# Patient Record
Sex: FEMALE | Race: WHITE | NOT HISPANIC OR LATINO | Employment: FULL TIME | URBAN - METROPOLITAN AREA
[De-identification: names, ages, dates, MRNs, and addresses within clinical notes are randomized per-mention and may not be internally consistent; named-entity substitution may affect disease eponyms.]

---

## 2017-02-10 ENCOUNTER — TRANSCRIBE ORDERS (OUTPATIENT)
Dept: ADMINISTRATIVE | Facility: HOSPITAL | Age: 27
End: 2017-02-10

## 2017-02-10 DIAGNOSIS — R00.2 PALPITATIONS: Primary | ICD-10-CM

## 2017-02-16 ENCOUNTER — APPOINTMENT (OUTPATIENT)
Dept: CARDIOLOGY | Facility: HOSPITAL | Age: 27
End: 2017-02-16
Attending: INTERNAL MEDICINE

## 2018-03-23 PROBLEM — M79.604 RIGHT LEG PAIN: Status: ACTIVE | Noted: 2018-03-23

## 2019-02-27 ENCOUNTER — OFFICE VISIT (OUTPATIENT)
Dept: FAMILY MEDICINE CLINIC | Facility: CLINIC | Age: 29
End: 2019-02-27

## 2019-02-27 VITALS
BODY MASS INDEX: 39.99 KG/M2 | SYSTOLIC BLOOD PRESSURE: 116 MMHG | HEIGHT: 65 IN | OXYGEN SATURATION: 98 % | TEMPERATURE: 98.3 F | DIASTOLIC BLOOD PRESSURE: 62 MMHG | HEART RATE: 81 BPM | WEIGHT: 240 LBS

## 2019-02-27 DIAGNOSIS — E55.9 VITAMIN D DEFICIENCY: ICD-10-CM

## 2019-02-27 DIAGNOSIS — R19.7 DIARRHEA, UNSPECIFIED TYPE: ICD-10-CM

## 2019-02-27 DIAGNOSIS — E61.1 IRON DEFICIENCY: ICD-10-CM

## 2019-02-27 DIAGNOSIS — Z13.220 SCREENING FOR HYPERLIPIDEMIA: ICD-10-CM

## 2019-02-27 DIAGNOSIS — N83.201 CYST OF RIGHT OVARY: ICD-10-CM

## 2019-02-27 DIAGNOSIS — F33.0 MILD EPISODE OF RECURRENT MAJOR DEPRESSIVE DISORDER (HCC): ICD-10-CM

## 2019-02-27 DIAGNOSIS — Z13.29 SCREENING FOR ENDOCRINE DISORDER: ICD-10-CM

## 2019-02-27 DIAGNOSIS — F41.9 ANXIETY: ICD-10-CM

## 2019-02-27 DIAGNOSIS — R20.0 NUMBNESS OF FINGERS OF BOTH HANDS: Primary | ICD-10-CM

## 2019-02-27 PROCEDURE — 99203 OFFICE O/P NEW LOW 30 MIN: CPT | Performed by: FAMILY MEDICINE

## 2019-03-10 NOTE — PROGRESS NOTES
Subjective   Sasha Stewart is a 28 y.o. female.     Chief Complaint   Patient presents with   • Establish Care   • Abdominal Pain     Since thursday   • Diarrhea     Since thursday   • Carpal Tunnel   • Depression       History of Present Illness     Acute complaints:  Sasha Stewart is a 28 y.o. female who presents today to establish care. She reports bilateral numbness in her hands. Global, not in a specific nerve distribution. It has been going on past few months. Not worsening. It does not appear related to anything specific. Happens randomly.    She has chronic depression and anxiety. She has a history of a right ovarian cyst which she reports is stable. She reports diarrhea and abdominal pain since Thursday.    The following portions of the patient's history were reviewed and updated as appropriate: allergies, current medications, past family history, past medical history, past social history, past surgical history and problem list.    Active Ambulatory Problems     Diagnosis Date Noted   • Dichorionic diamniotic twin gestation 2016   • Uterine size date discrepancy 2016   • Previous  section complicating pregnancy 2016   • Dichorionic diamniotic twin pregnancy in third trimester 2016   • Right leg pain 2018     Resolved Ambulatory Problems     Diagnosis Date Noted   • No Resolved Ambulatory Problems     Past Medical History:   Diagnosis Date   • Anemia    • Asthma    • Depression    • Fibroid    • PID (pelvic inflammatory disease)      Past Surgical History:   Procedure Laterality Date   •  SECTION      2013   •  SECTION WITH TUBAL Bilateral 2016    Procedure:  SECTION REPEAT WITH TUBAL;  Surgeon: Chrissy Hampton MD;  Location: [facility] LABOR DELIVERY;  Service:    • GASTRIC SLEEVE LAPAROSCOPIC     • TUBAL ABDOMINAL LIGATION     • WISDOM TOOTH EXTRACTION       Family History   Problem Relation Age of Onset   • Diabetes Mother    •  "Hypertension Mother    • Arthritis Mother    • Obesity Mother    • Hypertension Father    • Coronary artery disease Paternal Grandfather    • Stroke Paternal Grandfather    • Hypertension Maternal Grandmother    • Arthritis Sister    • Obesity Sister    • Migraines Sister    • Obesity Brother    • Arthritis Maternal Grandfather      Social History     Socioeconomic History   • Marital status:      Spouse name: Not on file   • Number of children: Not on file   • Years of education: Not on file   • Highest education level: Not on file   Social Needs   • Financial resource strain: Not on file   • Food insecurity - worry: Not on file   • Food insecurity - inability: Not on file   • Transportation needs - medical: Not on file   • Transportation needs - non-medical: Not on file   Occupational History   • Not on file   Tobacco Use   • Smoking status: Never Smoker   • Smokeless tobacco: Never Used   Substance and Sexual Activity   • Alcohol use: No   • Drug use: No   • Sexual activity: Yes     Partners: Male   Other Topics Concern   • Not on file   Social History Narrative   • Not on file         Review of Systems   Constitutional: Negative.    Gastrointestinal: Positive for abdominal pain and diarrhea. Negative for abdominal distention.   Neurological: Positive for numbness.       Objective   Blood pressure 116/62, pulse 81, temperature 98.3 °F (36.8 °C), temperature source Oral, height 165.1 cm (65\"), weight 109 kg (240 lb), SpO2 98 %, not currently breastfeeding.  Nursing note reviewed  Physical Exam  Const: NAD, A&Ox4, Pleasant, Cooperative  Eyes: EOMI, no conjunctivitis  ENT: No nasal discharge present, neck supple  Cardiac: Regular rate and rhythm, no cyanosis  Resp: Respiratory rate within normal limits, no increased work of breathing, no audible wheezing or retractions noted  GI: No distention or ascites  MSK: Motor and sensation grossly intact in bilateral upper extremities. Tinel negative at carpal tunnel " and cubital tunnel. Phalen negative  Neurologic: CN II-XII grossly intact  Psych: Appropriate mood and behavior.  Skin: Pink, warm, dry  Procedures  Assessment/Plan   Sasha was seen today for establish care, abdominal pain, diarrhea, carpal tunnel and depression.    Diagnoses and all orders for this visit:    Numbness of fingers of both hands  -     Hemoglobin A1c; Future  -     TSH; Future  -     Vitamin B12; Future  -     Vitamin B1, Whole Blood; Future    Mild episode of recurrent major depressive disorder (CMS/HCC)  -     sertraline (ZOLOFT) 50 MG tablet; Take 1 tablet by mouth Daily.    Anxiety  -     sertraline (ZOLOFT) 50 MG tablet; Take 1 tablet by mouth Daily.    Screening for endocrine disorder  -     Comprehensive Metabolic Panel; Future  -     TSH; Future  -     Urinalysis With Microscopic If Indicated (No Culture) - Urine, Clean Catch; Future    Screening for hyperlipidemia  -     Lipid Panel; Future    Diarrhea, unspecified type  -     CBC & Differential; Future    Cyst of right ovary    Iron deficiency  -     CBC & Differential; Future    Vitamin D deficiency  -     Vitamin D 25 Hydroxy; Future        Acute concerns:  #1 paresthesias of bilat UE  Most consistent with cubital tunnel  Discussed home treatments including towel wrap. Happens mostly at night with arms flexed underneath, improves when she stretches it out.  - Check labs    #2 depression with anxiety  Zoloft 50mg  Follow up 2 weeks    #3 diarrhea  She appears to have relation to her cycle      We will plan to obtain previous records both for chronic preventative care as well as those related to the current episode of care.  Any records that the patient brought with her today were reviewed personally by me during the visit today and will be scanned into the chart for posterity.    Patient Instructions   1.  Try towel wrapping for cubital tunnel syndrome around elbows.    2.  Have lab work done when fasting.    3.  Monitor increased stool  through cycle.    4.  Follow up in 1-2 weeks for lab review and recheck.      Return in about 2 weeks (around 3/13/2019).    Ambulatory progress note signed and attested to by Pino Willis D.O.

## 2019-03-22 ENCOUNTER — LAB (OUTPATIENT)
Dept: LAB | Facility: HOSPITAL | Age: 29
End: 2019-03-22

## 2019-03-22 DIAGNOSIS — Z13.220 SCREENING FOR HYPERLIPIDEMIA: ICD-10-CM

## 2019-03-22 DIAGNOSIS — E55.9 VITAMIN D DEFICIENCY: ICD-10-CM

## 2019-03-22 DIAGNOSIS — R19.7 DIARRHEA, UNSPECIFIED TYPE: ICD-10-CM

## 2019-03-22 DIAGNOSIS — Z13.29 SCREENING FOR ENDOCRINE DISORDER: ICD-10-CM

## 2019-03-22 DIAGNOSIS — R20.0 NUMBNESS OF FINGERS OF BOTH HANDS: ICD-10-CM

## 2019-03-22 DIAGNOSIS — E61.1 IRON DEFICIENCY: ICD-10-CM

## 2019-03-22 LAB
25(OH)D3 SERPL-MCNC: 15.2 NG/ML
ARTICHOKE IGE QN: 93 MG/DL (ref 0–130)
BASOPHILS # BLD AUTO: 0.02 10*3/MM3 (ref 0–0.2)
BASOPHILS NFR BLD AUTO: 0.6 % (ref 0–1)
CHOLEST SERPL-MCNC: 143 MG/DL (ref 0–200)
DEPRECATED RDW RBC AUTO: 43.2 FL (ref 37–54)
EOSINOPHIL # BLD AUTO: 0.11 10*3/MM3 (ref 0–0.3)
EOSINOPHIL NFR BLD AUTO: 3.1 % (ref 0–3)
ERYTHROCYTE [DISTWIDTH] IN BLOOD BY AUTOMATED COUNT: 14.4 % (ref 11.3–14.5)
HBA1C MFR BLD: 4.7 % (ref 4.8–5.6)
HCT VFR BLD AUTO: 36.3 % (ref 34.5–44)
HDLC SERPL-MCNC: 50 MG/DL (ref 40–60)
HGB BLD-MCNC: 11.3 G/DL (ref 11.5–15.5)
IMM GRANULOCYTES # BLD AUTO: 0.01 10*3/MM3 (ref 0–0.05)
IMM GRANULOCYTES NFR BLD AUTO: 0.3 % (ref 0–0.6)
LYMPHOCYTES # BLD AUTO: 0.84 10*3/MM3 (ref 0.6–4.8)
LYMPHOCYTES NFR BLD AUTO: 23.3 % (ref 24–44)
MCH RBC QN AUTO: 25.7 PG (ref 27–31)
MCHC RBC AUTO-ENTMCNC: 31.1 G/DL (ref 32–36)
MCV RBC AUTO: 82.7 FL (ref 80–99)
MONOCYTES # BLD AUTO: 0.44 10*3/MM3 (ref 0–1)
MONOCYTES NFR BLD AUTO: 12.2 % (ref 0–12)
NEUTROPHILS # BLD AUTO: 2.19 10*3/MM3 (ref 1.5–8.3)
NEUTROPHILS NFR BLD AUTO: 60.8 % (ref 41–71)
PLATELET # BLD AUTO: 296 10*3/MM3 (ref 150–450)
PMV BLD AUTO: 11.4 FL (ref 6–12)
RBC # BLD AUTO: 4.39 10*6/MM3 (ref 3.89–5.14)
TRIGL SERPL-MCNC: 57 MG/DL (ref 0–150)
TSH SERPL DL<=0.05 MIU/L-ACNC: 0.6 MIU/ML (ref 0.35–5.35)
VIT B12 BLD-MCNC: 503 PG/ML (ref 211–911)
WBC NRBC COR # BLD: 3.6 10*3/MM3 (ref 3.5–10.8)

## 2019-03-22 PROCEDURE — 84443 ASSAY THYROID STIM HORMONE: CPT

## 2019-03-22 PROCEDURE — 36415 COLL VENOUS BLD VENIPUNCTURE: CPT

## 2019-03-22 PROCEDURE — 82306 VITAMIN D 25 HYDROXY: CPT

## 2019-03-22 PROCEDURE — 80061 LIPID PANEL: CPT

## 2019-03-22 PROCEDURE — 83036 HEMOGLOBIN GLYCOSYLATED A1C: CPT

## 2019-03-22 PROCEDURE — 85025 COMPLETE CBC W/AUTO DIFF WBC: CPT

## 2019-03-22 PROCEDURE — 84425 ASSAY OF VITAMIN B-1: CPT

## 2019-03-22 PROCEDURE — 82607 VITAMIN B-12: CPT

## 2019-03-26 LAB — VIT B1 BLD-SCNC: 117.7 NMOL/L (ref 66.5–200)

## 2019-03-27 ENCOUNTER — LAB (OUTPATIENT)
Dept: LAB | Facility: HOSPITAL | Age: 29
End: 2019-03-27

## 2019-03-27 ENCOUNTER — TRANSCRIBE ORDERS (OUTPATIENT)
Dept: LAB | Facility: HOSPITAL | Age: 29
End: 2019-03-27

## 2019-03-27 DIAGNOSIS — Z13.29 SCREENING FOR ENDOCRINE DISORDER: ICD-10-CM

## 2019-03-27 DIAGNOSIS — Z13.29 SCREENING FOR ENDOCRINE DISORDER: Primary | ICD-10-CM

## 2019-03-27 LAB
ALBUMIN SERPL-MCNC: 4.14 G/DL (ref 3.2–4.8)
ALBUMIN/GLOB SERPL: 1.7 G/DL (ref 1.5–2.5)
ALP SERPL-CCNC: 111 U/L (ref 25–100)
ALT SERPL W P-5'-P-CCNC: 26 U/L (ref 7–40)
ANION GAP SERPL CALCULATED.3IONS-SCNC: 8 MMOL/L (ref 3–11)
AST SERPL-CCNC: 19 U/L (ref 0–33)
BACTERIA UR QL AUTO: NORMAL /HPF
BILIRUB SERPL-MCNC: 0.2 MG/DL (ref 0.3–1.2)
BILIRUB UR QL STRIP: NEGATIVE
BUN BLD-MCNC: 14 MG/DL (ref 9–23)
BUN/CREAT SERPL: 21.5 (ref 7–25)
CALCIUM SPEC-SCNC: 9 MG/DL (ref 8.7–10.4)
CHLORIDE SERPL-SCNC: 109 MMOL/L (ref 99–109)
CLARITY UR: CLEAR
CO2 SERPL-SCNC: 24 MMOL/L (ref 20–31)
COLOR UR: YELLOW
CREAT BLD-MCNC: 0.65 MG/DL (ref 0.6–1.3)
GFR SERPL CREATININE-BSD FRML MDRD: 109 ML/MIN/1.73
GLOBULIN UR ELPH-MCNC: 2.5 GM/DL
GLUCOSE BLD-MCNC: 89 MG/DL (ref 70–100)
GLUCOSE UR STRIP-MCNC: NEGATIVE MG/DL
HGB UR QL STRIP.AUTO: NEGATIVE
HYALINE CASTS UR QL AUTO: NORMAL /LPF
KETONES UR QL STRIP: NEGATIVE
LEUKOCYTE ESTERASE UR QL STRIP.AUTO: NEGATIVE
NITRITE UR QL STRIP: POSITIVE
PH UR STRIP.AUTO: 6.5 [PH] (ref 5–8)
POTASSIUM BLD-SCNC: 4 MMOL/L (ref 3.5–5.5)
PROT SERPL-MCNC: 6.6 G/DL (ref 5.7–8.2)
PROT UR QL STRIP: NEGATIVE
RBC # UR: NORMAL /HPF
REF LAB TEST METHOD: NORMAL
SODIUM BLD-SCNC: 141 MMOL/L (ref 132–146)
SP GR UR STRIP: 1.03 (ref 1–1.03)
SQUAMOUS #/AREA URNS HPF: NORMAL /HPF
UROBILINOGEN UR QL STRIP: ABNORMAL
WBC UR QL AUTO: NORMAL /HPF

## 2019-03-27 PROCEDURE — 81001 URINALYSIS AUTO W/SCOPE: CPT | Performed by: FAMILY MEDICINE

## 2019-03-27 PROCEDURE — 80053 COMPREHEN METABOLIC PANEL: CPT

## 2019-04-01 ENCOUNTER — TELEPHONE (OUTPATIENT)
Dept: FAMILY MEDICINE CLINIC | Facility: CLINIC | Age: 29
End: 2019-04-01

## 2019-04-16 RX ORDER — DOXYCYCLINE HYCLATE 50 MG/1
324 CAPSULE, GELATIN COATED ORAL
Qty: 90 TABLET | Refills: 0 | OUTPATIENT
Start: 2019-04-16 | End: 2019-04-25

## 2019-04-16 NOTE — TELEPHONE ENCOUNTER
PATIENT HAS BEEN WAITING FOR TWO WEEKS TO GET HER LAB RESULTS. SHE WOULD LIKE A CALL AND THE LETTER SENT.

## 2019-04-25 PROBLEM — J02.0 STREP PHARYNGITIS: Status: ACTIVE | Noted: 2019-04-25

## 2019-09-03 PROBLEM — J02.9 PHARYNGITIS: Status: ACTIVE | Noted: 2019-09-03

## 2019-09-03 PROBLEM — Z20.818 STREP THROAT EXPOSURE: Status: ACTIVE | Noted: 2019-09-03

## 2021-01-15 ENCOUNTER — OFFICE VISIT (OUTPATIENT)
Dept: FAMILY MEDICINE CLINIC | Facility: CLINIC | Age: 31
End: 2021-01-15

## 2021-01-15 VITALS
OXYGEN SATURATION: 99 % | SYSTOLIC BLOOD PRESSURE: 142 MMHG | HEIGHT: 65 IN | DIASTOLIC BLOOD PRESSURE: 88 MMHG | WEIGHT: 246 LBS | HEART RATE: 96 BPM | BODY MASS INDEX: 40.98 KG/M2

## 2021-01-15 DIAGNOSIS — Z00.00 ROUTINE MEDICAL EXAM: ICD-10-CM

## 2021-01-15 DIAGNOSIS — G44.219 EPISODIC TENSION-TYPE HEADACHE, NOT INTRACTABLE: ICD-10-CM

## 2021-01-15 DIAGNOSIS — R10.11 RIGHT UPPER QUADRANT ABDOMINAL PAIN: ICD-10-CM

## 2021-01-15 DIAGNOSIS — R11.0 NAUSEA: Primary | ICD-10-CM

## 2021-01-15 LAB
B-HCG UR QL: NEGATIVE
INTERNAL NEGATIVE CONTROL: NEGATIVE
INTERNAL POSITIVE CONTROL: POSITIVE
Lab: NORMAL

## 2021-01-15 PROCEDURE — 99214 OFFICE O/P EST MOD 30 MIN: CPT | Performed by: PHYSICIAN ASSISTANT

## 2021-01-15 PROCEDURE — 81025 URINE PREGNANCY TEST: CPT | Performed by: PHYSICIAN ASSISTANT

## 2021-01-15 RX ORDER — AMITRIPTYLINE HYDROCHLORIDE 25 MG/1
25 TABLET, FILM COATED ORAL NIGHTLY
Qty: 30 TABLET | Refills: 1 | Status: SHIPPED | OUTPATIENT
Start: 2021-01-15 | End: 2021-02-20 | Stop reason: SDUPTHER

## 2021-01-15 RX ORDER — OMEPRAZOLE 20 MG/1
20 CAPSULE, DELAYED RELEASE ORAL DAILY
Qty: 30 CAPSULE | Refills: 1 | Status: SHIPPED | OUTPATIENT
Start: 2021-01-15 | End: 2021-09-14

## 2021-01-15 NOTE — PROGRESS NOTES
"    Chief Complaint   Patient presents with   • Headache   • Nausea     Hx of h. pylori - when patient eats or drinks she gets nauseated        HPI     Sasha Stewart is a pleasant 30 y.o. female who presents for evaluation of \"chief complaint.\"     Nausea and lightheaded with food and liquids for 1 week. Denies abdominal pain. Hx tubal ligation in . History of h.pylori with similar symptoms. Last time was in 2017.       New onset headaches for 2 months. Frontal headache primarily on the left. No associated symptoms. Intermittent. Aleve has helped. Yesterday did not. She does acknowledge more stress over the last 2 months.      Past Medical History:   Diagnosis Date   • Anemia    • Asthma    • Depression    • Fibroid    • PID (pelvic inflammatory disease)        Past Surgical History:   Procedure Laterality Date   •  SECTION      2013   •  SECTION WITH TUBAL Bilateral 2016    Procedure:  SECTION REPEAT WITH TUBAL;  Surgeon: Chrissy Hampton MD;  Location: [facility] LABOR DELIVERY;  Service:    • GASTRIC SLEEVE LAPAROSCOPIC     • TUBAL ABDOMINAL LIGATION     • WISDOM TOOTH EXTRACTION         Family History   Problem Relation Age of Onset   • Diabetes Mother    • Hypertension Mother    • Arthritis Mother    • Obesity Mother    • Hypertension Father    • Coronary artery disease Paternal Grandfather    • Stroke Paternal Grandfather    • Hypertension Maternal Grandmother    • Arthritis Sister    • Obesity Sister    • Migraines Sister    • Obesity Brother    • Arthritis Maternal Grandfather        Social History     Socioeconomic History   • Marital status:      Spouse name: Not on file   • Number of children: Not on file   • Years of education: Not on file   • Highest education level: Not on file   Tobacco Use   • Smoking status: Never Smoker   • Smokeless tobacco: Never Used   Substance and Sexual Activity   • Alcohol use: No   • Drug use: No   • Sexual activity: Yes     Partners: " Male       No Known Allergies    ROS    Review of Systems   Constitutional: Negative for chills and fever.   Gastrointestinal: Positive for nausea. Negative for abdominal pain, blood in stool, constipation, diarrhea, vomiting and GERD.   Neurological: Positive for light-headedness.       Vitals:    01/15/21 1500   BP: 142/88   Pulse: 96   SpO2: 99%     Body mass index is 40.94 kg/m².      Current Outpatient Medications:   •  sertraline (ZOLOFT) 50 MG tablet, Take 1 tablet by mouth Daily., Disp: 90 tablet, Rfl: 0  •  amitriptyline (ELAVIL) 25 MG tablet, Take 1 tablet by mouth Every Night., Disp: 30 tablet, Rfl: 1  •  omeprazole (PrilOSEC) 20 MG capsule, Take 1 capsule by mouth Daily., Disp: 30 capsule, Rfl: 1    PE    Physical Exam  Vitals signs reviewed.   Constitutional:       General: She is not in acute distress.     Appearance: She is well-developed. She is obese.   HENT:      Head: Normocephalic and atraumatic.   Eyes:      Conjunctiva/sclera: Conjunctivae normal.   Neck:      Musculoskeletal: Normal range of motion.   Cardiovascular:      Rate and Rhythm: Normal rate and regular rhythm.      Heart sounds: Normal heart sounds. No murmur.   Pulmonary:      Effort: Pulmonary effort is normal.      Breath sounds: Normal breath sounds.   Abdominal:      Tenderness: There is abdominal tenderness in the right upper quadrant. There is no guarding or rebound.   Skin:     General: Skin is warm and dry.   Neurological:      Mental Status: She is alert.      Gait: Gait normal.   Psychiatric:         Speech: Speech normal.         Behavior: Behavior normal.          A/P    Problem List Items Addressed This Visit     None      Visit Diagnoses     Nausea    -  Primary  -Worse with meals, negative urine HCG today  -Ddx: gastritis, h.pylori, PUD, pancreatitis, gallbladder disorder, among others  -Order breath test, lipase, GB u/s  -Start omeprazole  -RTC in 1 month for physical with Dr. Willis or sooner if needed    Relevant  Orders    US Gallbladder    Lipase     POC Pregnancy, Urine     H. Pylori Breath Test - Breath, Lung     Right upper quadrant abdominal pain        Relevant Orders    US Gallbladder    Episodic tension-type headache, not intractable      -Hx increased stress  -Start low dose amitriptyline qhs for tension headache  -NSAIDs prn    Relevant Medications    sertraline (ZOLOFT) 50 MG tablet    amitriptyline (ELAVIL) 25 MG tablet    Routine medical exam        Relevant Orders    Ferritin     CBC & Differential     Comprehensive Metabolic Panel     Lipid Panel     TSH Rfx On Abnormal To Free T4     Urinalysis With Culture If Indicated - Urine, Clean Catch    Iron Profile     Vitamin D 25 Hydroxy           Plan of care was reviewed with patient at the conclusion of today's visit. Education was provided regarding diagnoses, management, prescribed or recommended OTC products, and the importance of compliance with follow-up appointments. The patient was counseled regarding the risks, benefits, and possible side-effects of treatment. I advised the patient to keep me informed of any acute changes in their status including new, worsening, or persistent symptoms. Patient expresses understanding and agreement with the management plan.        MARILOU Howell

## 2021-01-26 ENCOUNTER — LAB (OUTPATIENT)
Dept: LAB | Facility: HOSPITAL | Age: 31
End: 2021-01-26

## 2021-01-26 DIAGNOSIS — Z00.00 ROUTINE MEDICAL EXAM: ICD-10-CM

## 2021-01-26 DIAGNOSIS — R11.0 NAUSEA: ICD-10-CM

## 2021-01-26 LAB
BASOPHILS # BLD MANUAL: 0.12 10*3/MM3 (ref 0–0.2)
BASOPHILS NFR BLD AUTO: 2.1 % (ref 0–1.5)
DEPRECATED RDW RBC AUTO: 37.3 FL (ref 37–54)
EOSINOPHIL # BLD MANUAL: 0.06 10*3/MM3 (ref 0–0.4)
EOSINOPHIL NFR BLD MANUAL: 1 % (ref 0.3–6.2)
ERYTHROCYTE [DISTWIDTH] IN BLOOD BY AUTOMATED COUNT: 14.3 % (ref 12.3–15.4)
HCT VFR BLD AUTO: 30 % (ref 34–46.6)
HGB BLD-MCNC: 9.3 G/DL (ref 12–15.9)
LYMPHOCYTES # BLD MANUAL: 1.03 10*3/MM3 (ref 0.7–3.1)
LYMPHOCYTES NFR BLD MANUAL: 1 % (ref 5–12)
LYMPHOCYTES NFR BLD MANUAL: 17.5 % (ref 19.6–45.3)
MCH RBC QN AUTO: 23.1 PG (ref 26.6–33)
MCHC RBC AUTO-ENTMCNC: 31 G/DL (ref 31.5–35.7)
MCV RBC AUTO: 74.6 FL (ref 79–97)
MICROCYTES BLD QL: ABNORMAL
MONOCYTES # BLD AUTO: 0.06 10*3/MM3 (ref 0.1–0.9)
NEUTROPHILS # BLD AUTO: 4.63 10*3/MM3 (ref 1.7–7)
NEUTROPHILS NFR BLD MANUAL: 78.4 % (ref 42.7–76)
PLAT MORPH BLD: NORMAL
PLATELET # BLD AUTO: 460 10*3/MM3 (ref 140–450)
PMV BLD AUTO: 10.9 FL (ref 6–12)
RBC # BLD AUTO: 4.02 10*6/MM3 (ref 3.77–5.28)
WBC # BLD AUTO: 5.9 10*3/MM3 (ref 3.4–10.8)
WBC MORPH BLD: NORMAL

## 2021-01-26 PROCEDURE — 84466 ASSAY OF TRANSFERRIN: CPT

## 2021-01-26 PROCEDURE — 85007 BL SMEAR W/DIFF WBC COUNT: CPT

## 2021-01-26 PROCEDURE — 82728 ASSAY OF FERRITIN: CPT

## 2021-01-26 PROCEDURE — 83013 H PYLORI (C-13) BREATH: CPT

## 2021-01-26 PROCEDURE — 83540 ASSAY OF IRON: CPT

## 2021-01-26 PROCEDURE — 81001 URINALYSIS AUTO W/SCOPE: CPT

## 2021-01-26 PROCEDURE — 36415 COLL VENOUS BLD VENIPUNCTURE: CPT

## 2021-01-26 PROCEDURE — 80053 COMPREHEN METABOLIC PANEL: CPT

## 2021-01-26 PROCEDURE — 80061 LIPID PANEL: CPT

## 2021-01-26 PROCEDURE — 83690 ASSAY OF LIPASE: CPT

## 2021-01-26 PROCEDURE — 84443 ASSAY THYROID STIM HORMONE: CPT

## 2021-01-26 PROCEDURE — 85025 COMPLETE CBC W/AUTO DIFF WBC: CPT

## 2021-01-26 PROCEDURE — 82306 VITAMIN D 25 HYDROXY: CPT

## 2021-01-27 LAB
25(OH)D3 SERPL-MCNC: 12.5 NG/ML (ref 30–100)
ALBUMIN SERPL-MCNC: 4.4 G/DL (ref 3.5–5.2)
ALBUMIN/GLOB SERPL: 1.4 G/DL
ALP SERPL-CCNC: 97 U/L (ref 39–117)
ALT SERPL W P-5'-P-CCNC: 18 U/L (ref 1–33)
ANION GAP SERPL CALCULATED.3IONS-SCNC: 10.4 MMOL/L (ref 5–15)
AST SERPL-CCNC: 19 U/L (ref 1–32)
BACTERIA UR QL AUTO: NORMAL /HPF
BILIRUB SERPL-MCNC: <0.2 MG/DL (ref 0–1.2)
BILIRUB UR QL STRIP: NEGATIVE
BUN SERPL-MCNC: 19 MG/DL (ref 6–20)
BUN/CREAT SERPL: 27.9 (ref 7–25)
CALCIUM SPEC-SCNC: 9.4 MG/DL (ref 8.6–10.5)
CHLORIDE SERPL-SCNC: 103 MMOL/L (ref 98–107)
CHOLEST SERPL-MCNC: 192 MG/DL (ref 0–200)
CLARITY UR: CLEAR
CO2 SERPL-SCNC: 24.6 MMOL/L (ref 22–29)
COLOR UR: YELLOW
CREAT SERPL-MCNC: 0.68 MG/DL (ref 0.57–1)
FERRITIN SERPL-MCNC: 7.23 NG/ML (ref 13–150)
GFR SERPL CREATININE-BSD FRML MDRD: 102 ML/MIN/1.73
GLOBULIN UR ELPH-MCNC: 3.1 GM/DL
GLUCOSE SERPL-MCNC: 72 MG/DL (ref 65–99)
GLUCOSE UR STRIP-MCNC: NEGATIVE MG/DL
HDLC SERPL-MCNC: 65 MG/DL (ref 40–60)
HGB UR QL STRIP.AUTO: ABNORMAL
HYALINE CASTS UR QL AUTO: NORMAL /LPF
IRON 24H UR-MRATE: 18 MCG/DL (ref 37–145)
IRON SATN MFR SERPL: 3 % (ref 20–50)
KETONES UR QL STRIP: NEGATIVE
LDLC SERPL CALC-MCNC: 115 MG/DL (ref 0–100)
LDLC/HDLC SERPL: 1.76 {RATIO}
LEUKOCYTE ESTERASE UR QL STRIP.AUTO: NEGATIVE
LIPASE SERPL-CCNC: 23 U/L (ref 13–60)
NITRITE UR QL STRIP: NEGATIVE
PH UR STRIP.AUTO: 5.5 [PH] (ref 5–8)
POTASSIUM SERPL-SCNC: 3.6 MMOL/L (ref 3.5–5.2)
PROT SERPL-MCNC: 7.5 G/DL (ref 6–8.5)
PROT UR QL STRIP: NEGATIVE
RBC # UR: NORMAL /HPF
REF LAB TEST METHOD: NORMAL
SODIUM SERPL-SCNC: 138 MMOL/L (ref 136–145)
SP GR UR STRIP: >=1.03 (ref 1–1.03)
SQUAMOUS #/AREA URNS HPF: NORMAL /HPF
TIBC SERPL-MCNC: 517 MCG/DL (ref 298–536)
TRANSFERRIN SERPL-MCNC: 347 MG/DL (ref 200–360)
TRIGL SERPL-MCNC: 62 MG/DL (ref 0–150)
TSH SERPL DL<=0.05 MIU/L-ACNC: 1.02 UIU/ML (ref 0.27–4.2)
UROBILINOGEN UR QL STRIP: ABNORMAL
VLDLC SERPL-MCNC: 12 MG/DL (ref 5–40)
WBC UR QL AUTO: NORMAL /HPF

## 2021-01-28 LAB — UREA BREATH TEST QL: NEGATIVE

## 2021-01-29 ENCOUNTER — TELEPHONE (OUTPATIENT)
Dept: FAMILY MEDICINE CLINIC | Facility: CLINIC | Age: 31
End: 2021-01-29

## 2021-01-29 PROBLEM — E55.9 VITAMIN D DEFICIENCY: Status: ACTIVE | Noted: 2021-01-29

## 2021-01-29 PROBLEM — D50.9 IRON DEFICIENCY ANEMIA: Status: ACTIVE | Noted: 2021-01-29

## 2021-01-29 RX ORDER — ERGOCALCIFEROL 1.25 MG/1
50000 CAPSULE ORAL WEEKLY
Qty: 12 CAPSULE | Refills: 0 | Status: SHIPPED | OUTPATIENT
Start: 2021-01-29 | End: 2021-09-14

## 2021-01-29 RX ORDER — DOXYCYCLINE HYCLATE 50 MG/1
324 CAPSULE, GELATIN COATED ORAL EVERY OTHER DAY
Qty: 90 TABLET | Refills: 0 | Status: SHIPPED | OUTPATIENT
Start: 2021-01-29 | End: 2022-04-28

## 2021-02-19 ENCOUNTER — OFFICE VISIT (OUTPATIENT)
Dept: FAMILY MEDICINE CLINIC | Facility: CLINIC | Age: 31
End: 2021-02-19

## 2021-02-19 VITALS
WEIGHT: 265.2 LBS | OXYGEN SATURATION: 98 % | BODY MASS INDEX: 44.18 KG/M2 | HEART RATE: 78 BPM | HEIGHT: 65 IN | SYSTOLIC BLOOD PRESSURE: 124 MMHG | DIASTOLIC BLOOD PRESSURE: 78 MMHG

## 2021-02-19 DIAGNOSIS — G44.209 TENSION HEADACHE: ICD-10-CM

## 2021-02-19 DIAGNOSIS — R42 VERTIGO: Primary | ICD-10-CM

## 2021-02-19 DIAGNOSIS — G47.9 SLEEP DISORDER: ICD-10-CM

## 2021-02-19 PROCEDURE — 99214 OFFICE O/P EST MOD 30 MIN: CPT | Performed by: PHYSICIAN ASSISTANT

## 2021-02-19 NOTE — PATIENT INSTRUCTIONS
-Increase amitriptyline to 1.5 tablets nightly for headaches and sleep  -Try the Epley's maneuver at home for vertigo.  If symptoms continue after 1 week, please call for a physical therapy referral    Benign Positional Vertigo  Vertigo is the feeling that you or your surroundings are moving when they are not. Benign positional vertigo is the most common form of vertigo. This is usually a harmless condition (benign). This condition is positional. This means that symptoms are triggered by certain movements and positions.  This condition can be dangerous if it occurs while you are doing something that could cause harm to you or others. This includes activities such as driving or operating machinery.  What are the causes?  In many cases, the cause of this condition is not known. It may be caused by a disturbance in an area of the inner ear that helps your brain to sense movement and balance. This disturbance can be caused by:  · Viral infection (labyrinthitis).  · Head injury.  · Repetitive motion, such as jumping, dancing, or running.  What increases the risk?  You are more likely to develop this condition if:  · You are a woman.  · You are 50 years of age or older.  What are the signs or symptoms?  Symptoms of this condition usually happen when you move your head or your eyes in different directions. Symptoms may start suddenly, and usually last for less than a minute. They include:  · Loss of balance and falling.  · Feeling like you are spinning or moving.  · Feeling like your surroundings are spinning or moving.  · Nausea and vomiting.  · Blurred vision.  · Dizziness.  · Involuntary eye movement (nystagmus).  Symptoms can be mild and cause only minor problems, or they can be severe and interfere with daily life. Episodes of benign positional vertigo may return (recur) over time. Symptoms may improve over time.  How is this diagnosed?  This condition may be diagnosed based on:  · Your medical history.  · Physical  exam of the head, neck, and ears.  · Tests, such as:  ? MRI.  ? CT scan.  ? Eye movement tests. Your health care provider may ask you to change positions quickly while he or she watches you for symptoms of benign positional vertigo, such as nystagmus. Eye movement may be tested with a variety of exams that are designed to evaluate or stimulate vertigo.  ? An electroencephalogram (EEG). This records electrical activity in your brain.  ? Hearing tests.  You may be referred to a health care provider who specializes in ear, nose, and throat (ENT) problems (otolaryngologist) or a provider who specializes in disorders of the nervous system (neurologist).  How is this treated?    This condition may be treated in a session in which your health care provider moves your head in specific positions to adjust your inner ear back to normal. Treatment for this condition may take several sessions. Surgery may be needed in severe cases, but this is rare.   In some cases, benign positional vertigo may resolve on its own in 2-4 weeks.  Follow these instructions at home:  Safety  · Move slowly. Avoid sudden body or head movements or certain positions, as told by your health care provider.  · Avoid driving until your health care provider says it is safe for you to do so.  · Avoid operating heavy machinery until your health care provider says it is safe for you to do so.  · Avoid doing any tasks that would be dangerous to you or others if vertigo occurs.  · If you have trouble walking or keeping your balance, try using a cane for stability. If you feel dizzy or unstable, sit down right away.  · Return to your normal activities as told by your health care provider. Ask your health care provider what activities are safe for you.  General instructions  · Take over-the-counter and prescription medicines only as told by your health care provider.  · Drink enough fluid to keep your urine pale yellow.  · Keep all follow-up visits as told by  "your health care provider. This is important.  Contact a health care provider if:  · You have a fever.  · Your condition gets worse or you develop new symptoms.  · Your family or friends notice any behavioral changes.  · You have nausea or vomiting that gets worse.  · You have numbness or a \"pins and needles\" sensation.  Get help right away if you:  · Have difficulty speaking or moving.  · Are always dizzy.  · Faint.  · Develop severe headaches.  · Have weakness in your legs or arms.  · Have changes in your hearing or vision.  · Develop a stiff neck.  · Develop sensitivity to light.  Summary  · Vertigo is the feeling that you or your surroundings are moving when they are not. Benign positional vertigo is the most common form of vertigo.  · The cause of this condition is not known. It may be caused by a disturbance in an area of the inner ear that helps your brain to sense movement and balance.  · Symptoms include loss of balance and falling, feeling that you or your surroundings are moving, nausea and vomiting, and blurred vision.  · This condition can be diagnosed based on symptoms, physical exam, and other tests, such as MRI, CT scan, eye movement tests, and hearing tests.  · Follow safety instructions as told by your health care provider. You will also be told when to contact your health care provider in case of problems.  This information is not intended to replace advice given to you by your health care provider. Make sure you discuss any questions you have with your health care provider.  Document Revised: 05/29/2019 Document Reviewed: 05/29/2019  Elsevier Patient Education © 2020 [employer]    "

## 2021-02-19 NOTE — PROGRESS NOTES
"    Chief Complaint   Patient presents with   • Dizziness     Has past history. Started again this morning       HPI     Sasha Stewart is a pleasant 30 y.o. female who presents for evaluation of \"chief complaint.\"   Woke up this morning, felt like \"the whole room was spinning.\" Associated nausea. Lasted for about 90 minutes. Feels groggy currently but dizziness is resolved. Has had similar symptoms recently but not as severe as today. Denies recent URI, ear pain, congestion, fever, hearing loss, vomiting.     Also difficulty with sleep onset and maintenance since reducing alcohol intake 6 months ago. Was self medicating to help with sleep. She does have extremity pain and compulsion to get up and move as soon as she gets comfortable. Was told she has RLS during last pregnancy. She is not currently treated for this. Labs 21 showed iron deficiency anemia with hgb 10, iron 18, ferritin, 7. She just started taking an iron supplement 2 weeks ago. She has coffee in mornings. No alcohol use. She does not regularly exercise.     Amitriptyline has helped reduce headache frequency. She has tolerated it well for tension headaches.      Past Medical History:   Diagnosis Date   • Anemia    • Asthma    • Depression    • Fibroid    • PID (pelvic inflammatory disease)        Past Surgical History:   Procedure Laterality Date   •  SECTION      2013   •  SECTION WITH TUBAL Bilateral 2016    Procedure:  SECTION REPEAT WITH TUBAL;  Surgeon: Chrissy Hampton MD;  Location: [facility] LABOR DELIVERY;  Service:    • GASTRIC SLEEVE LAPAROSCOPIC     • TUBAL ABDOMINAL LIGATION     • WISDOM TOOTH EXTRACTION         Family History   Problem Relation Age of Onset   • Diabetes Mother    • Hypertension Mother    • Arthritis Mother    • Obesity Mother    • Hypertension Father    • Coronary artery disease Paternal Grandfather    • Stroke Paternal Grandfather    • Hypertension Maternal Grandmother    • Arthritis " Sister    • Obesity Sister    • Migraines Sister    • Obesity Brother    • Arthritis Maternal Grandfather        Social History     Socioeconomic History   • Marital status:      Spouse name: Not on file   • Number of children: Not on file   • Years of education: Not on file   • Highest education level: Not on file   Tobacco Use   • Smoking status: Never Smoker   • Smokeless tobacco: Never Used   Substance and Sexual Activity   • Alcohol use: No   • Drug use: No   • Sexual activity: Yes     Partners: Male       No Known Allergies    ROS    Review of Systems   Constitutional: Negative for fever.   HENT: Negative for congestion, ear pain and hearing loss.    Respiratory: Negative for cough.    Neurological: Positive for dizziness and headache.   Psychiatric/Behavioral: Positive for sleep disturbance. Negative for stress.       Vitals:    02/19/21 1520   BP: 124/78   Pulse: 78   SpO2: 98%     Body mass index is 44.13 kg/m².      Current Outpatient Medications:   •  amitriptyline (ELAVIL) 25 MG tablet, Take 1 tablet by mouth Every Night., Disp: 30 tablet, Rfl: 1  •  ferrous gluconate (FERGON) 324 MG tablet, Take 1 tablet by mouth Every Other Day. Take with food, Disp: 90 tablet, Rfl: 0  •  omeprazole (PrilOSEC) 20 MG capsule, Take 1 capsule by mouth Daily., Disp: 30 capsule, Rfl: 1  •  sertraline (ZOLOFT) 50 MG tablet, Take 1 tablet by mouth Daily., Disp: 90 tablet, Rfl: 0  •  vitamin D (ERGOCALCIFEROL) 1.25 MG (14874 UT) capsule capsule, Take 1 capsule by mouth 1 (One) Time Per Week., Disp: 12 capsule, Rfl: 0    PE    Physical Exam  Vitals signs reviewed.   Constitutional:       General: She is not in acute distress.     Appearance: She is well-developed.   HENT:      Head: Normocephalic and atraumatic.      Right Ear: Tympanic membrane normal. Tympanic membrane is not erythematous.      Left Ear: Tympanic membrane normal. Tympanic membrane is not erythematous.   Eyes:      Conjunctiva/sclera: Conjunctivae  normal.   Neck:      Musculoskeletal: Normal range of motion.   Cardiovascular:      Rate and Rhythm: Normal rate and regular rhythm.      Heart sounds: Normal heart sounds. No murmur.   Pulmonary:      Effort: Pulmonary effort is normal.      Breath sounds: Normal breath sounds.   Skin:     General: Skin is warm and dry.   Neurological:      Mental Status: She is alert.      Gait: Gait normal.      Comments: Patient symptoms reproduced with Renetta-Hallpike maneuver on left. Symptoms resolve after 30-45 seconds. No significant nystagmus appreciated. Negative on right.    Psychiatric:         Speech: Speech normal.         Behavior: Behavior normal.          A/P    Problem List Items Addressed This Visit     None      Visit Diagnoses     Vertigo    -  Primary  -Suspect BPPV  -Handout on home Epley maneuver provided. Try for 1 week. If symptoms not better, consider vestibular rehab referral or ENT consult      Sleep disorder      -Discussed sleep hygiene, RLS may contribute  -This may improve with more time on her iron supplement  -In the meantime, increase amitriptyline to 1.5 tabs nightly which may also help lingering headaches  -Keep appointment with Dr. Willis on 3/5/21      Tension headache              Plan of care was reviewed with patient at the conclusion of today's visit. Education was provided regarding diagnoses, management, prescribed or recommended OTC products, and the importance of compliance with follow-up appointments. The patient was counseled regarding the risks, benefits, and possible side-effects of treatment. I advised the patient to keep me informed of any acute changes in their status including new, worsening, or persistent symptoms. Patient expresses understanding and agreement with the management plan.        MARILOU Howell

## 2021-02-20 RX ORDER — AMITRIPTYLINE HYDROCHLORIDE 25 MG/1
40 TABLET, FILM COATED ORAL NIGHTLY
Qty: 60 TABLET | Refills: 1 | Status: SHIPPED | OUTPATIENT
Start: 2021-02-20 | End: 2021-05-26 | Stop reason: SDUPTHER

## 2021-05-26 ENCOUNTER — OFFICE VISIT (OUTPATIENT)
Dept: FAMILY MEDICINE CLINIC | Facility: CLINIC | Age: 31
End: 2021-05-26

## 2021-05-26 VITALS
SYSTOLIC BLOOD PRESSURE: 132 MMHG | WEIGHT: 290.6 LBS | BODY MASS INDEX: 48.42 KG/M2 | OXYGEN SATURATION: 99 % | HEART RATE: 92 BPM | HEIGHT: 65 IN | DIASTOLIC BLOOD PRESSURE: 86 MMHG

## 2021-05-26 DIAGNOSIS — J02.9 PHARYNGITIS, UNSPECIFIED ETIOLOGY: ICD-10-CM

## 2021-05-26 DIAGNOSIS — G47.00 INSOMNIA, UNSPECIFIED TYPE: ICD-10-CM

## 2021-05-26 DIAGNOSIS — J01.00 ACUTE MAXILLARY SINUSITIS, RECURRENCE NOT SPECIFIED: Primary | ICD-10-CM

## 2021-05-26 PROCEDURE — 99213 OFFICE O/P EST LOW 20 MIN: CPT | Performed by: PHYSICIAN ASSISTANT

## 2021-05-26 PROCEDURE — 87880 STREP A ASSAY W/OPTIC: CPT | Performed by: PHYSICIAN ASSISTANT

## 2021-05-26 RX ORDER — AMITRIPTYLINE HYDROCHLORIDE 25 MG/1
40 TABLET, FILM COATED ORAL NIGHTLY
Qty: 135 TABLET | Refills: 0 | Status: SHIPPED | OUTPATIENT
Start: 2021-05-26 | End: 2023-02-13 | Stop reason: SDUPTHER

## 2021-05-26 RX ORDER — HYDROXYZINE HYDROCHLORIDE 25 MG/1
12.5-25 TABLET, FILM COATED ORAL 3 TIMES DAILY PRN
Qty: 30 TABLET | Refills: 2 | Status: SHIPPED | OUTPATIENT
Start: 2021-05-26 | End: 2022-09-23 | Stop reason: ALTCHOICE

## 2021-05-26 RX ORDER — LORATADINE 10 MG/1
10 TABLET ORAL DAILY
Qty: 30 TABLET | Refills: 11 | Status: SHIPPED | OUTPATIENT
Start: 2021-05-26 | End: 2022-04-28

## 2021-05-26 RX ORDER — FLUTICASONE PROPIONATE 50 MCG
2 SPRAY, SUSPENSION (ML) NASAL DAILY
Qty: 18.2 ML | Refills: 2 | Status: SHIPPED | OUTPATIENT
Start: 2021-05-26

## 2021-05-27 ENCOUNTER — TELEPHONE (OUTPATIENT)
Dept: FAMILY MEDICINE CLINIC | Facility: CLINIC | Age: 31
End: 2021-05-27

## 2021-05-27 DIAGNOSIS — J01.00 ACUTE MAXILLARY SINUSITIS, RECURRENCE NOT SPECIFIED: Primary | ICD-10-CM

## 2021-05-27 RX ORDER — AZITHROMYCIN 250 MG/1
TABLET, FILM COATED ORAL
Qty: 6 TABLET | Refills: 0 | Status: SHIPPED | OUTPATIENT
Start: 2021-05-27 | End: 2021-06-01

## 2021-05-27 NOTE — TELEPHONE ENCOUNTER
Called and informed pt Esther has been sent to her pharmacy and will be available for pick-up shortly. Pt verbalized understanding and has no further questions at this time.

## 2021-05-27 NOTE — TELEPHONE ENCOUNTER
Caller: Sasha Stewart    Relationship: Self    Best call back number: [phone]    What medication are you requesting: ANTIBIOTIC FOR SINUS INFECTION     What are your current symptoms: DRAINAGE, SORE THROAT, PAIN AND PRESSURE FACE & HEAD, STUFFY NOSE, SNEEZING, COUGH     How long have you been experiencing symptoms: 4 DAYS    Have you had these symptoms before:    [x] Yes  [] No    Have you been treated for these symptoms before:   [x] Yes  [] No    If a prescription is needed, what is your preferred pharmacy and phone number: [address] [phone] [place] [phone]      Additional notes: PATIENT SEEN DR. BECKFORD YESTERDAY AND WAS TOLD CALL IF HER SYMPTOMS GOT WORSE. PATIENT NEEDS MEDICATION TO HELP TREAT HER SYMPTOMS.

## 2021-06-11 LAB
EXPIRATION DATE: NORMAL
INTERNAL CONTROL: NORMAL
Lab: NORMAL
S PYO AG THROAT QL: NEGATIVE

## 2021-08-05 ENCOUNTER — OFFICE VISIT (OUTPATIENT)
Dept: FAMILY MEDICINE CLINIC | Facility: CLINIC | Age: 31
End: 2021-08-05

## 2021-08-05 VITALS
WEIGHT: 293 LBS | SYSTOLIC BLOOD PRESSURE: 128 MMHG | HEIGHT: 65 IN | OXYGEN SATURATION: 98 % | DIASTOLIC BLOOD PRESSURE: 84 MMHG | BODY MASS INDEX: 48.82 KG/M2 | HEART RATE: 67 BPM

## 2021-08-05 DIAGNOSIS — J06.9 VIRAL UPPER RESPIRATORY TRACT INFECTION: ICD-10-CM

## 2021-08-05 DIAGNOSIS — F33.41 RECURRENT MAJOR DEPRESSIVE DISORDER, IN PARTIAL REMISSION (HCC): Primary | ICD-10-CM

## 2021-08-05 PROCEDURE — 99213 OFFICE O/P EST LOW 20 MIN: CPT | Performed by: INTERNAL MEDICINE

## 2021-08-05 RX ORDER — GUAIFENESIN AND DEXTROMETHORPHAN HYDROBROMIDE 600; 30 MG/1; MG/1
1 TABLET, EXTENDED RELEASE ORAL 2 TIMES DAILY PRN
Qty: 30 TABLET | Refills: 0 | Status: SHIPPED | OUTPATIENT
Start: 2021-08-05 | End: 2021-09-14

## 2021-08-05 RX ORDER — SERTRALINE HYDROCHLORIDE 100 MG/1
100 TABLET, FILM COATED ORAL DAILY
Qty: 30 TABLET | Refills: 6 | Status: SHIPPED | OUTPATIENT
Start: 2021-08-05 | End: 2021-09-14

## 2021-08-05 RX ORDER — BROMPHENIRAMINE MALEATE, PSEUDOEPHEDRINE HYDROCHLORIDE, AND DEXTROMETHORPHAN HYDROBROMIDE 2; 30; 10 MG/5ML; MG/5ML; MG/5ML
5 SYRUP ORAL 4 TIMES DAILY PRN
Qty: 100 ML | Refills: 0 | Status: SHIPPED | OUTPATIENT
Start: 2021-08-05 | End: 2021-09-14

## 2021-08-05 RX ORDER — AZITHROMYCIN 250 MG/1
TABLET, FILM COATED ORAL
Qty: 6 TABLET | Refills: 0 | Status: SHIPPED | OUTPATIENT
Start: 2021-08-05 | End: 2021-09-14

## 2021-08-05 NOTE — PROGRESS NOTES
Sasha Stewart  1990  [id number]  Patient Care Team:  Pino Willis DO as PCP - General (Family Medicine)    Sasha Stewart is a 30 y.o. female here today to establish care.  This patient is accompanied by their self who contributes to the history of their care.    Chief Complaint:    Chief Complaint   Patient presents with   • Sore Throat   • Nasal Congestion   • URI         History of Present Illness:    sx began this past Monday or . Lost taste of smell and taste. Can now taste and smell since Tuesday. Denies fevers of chils. Denies wheezing. Cough productive of nothing. Denies ear aches. Denies sinus pain/pressure. Continues to take flonase.     Worsening depression and anxiety lately. No HI/SI. Sx worsened over past couple of weeks. Has been on stable dose of 50 mg x 5 years.    Past Medical History:   Diagnosis Date   • Anemia    • Asthma    • Depression    • Fibroid    • PID (pelvic inflammatory disease)        Past Surgical History:   Procedure Laterality Date   •  SECTION      2013   •  SECTION WITH TUBAL Bilateral 2016    Procedure:  SECTION REPEAT WITH TUBAL;  Surgeon: Chrissy Hampton MD;  Location: [facility] include [facility] beds at the [facility] LABOR DELIVERY;  Service:    • GASTRIC SLEEVE LAPAROSCOPIC     • TUBAL ABDOMINAL LIGATION     • WISDOM TOOTH EXTRACTION          Family History   Problem Relation Age of Onset   • Diabetes Mother    • Hypertension Mother    • Arthritis Mother    • Obesity Mother    • Hypertension Father    • Coronary artery disease Paternal Grandfather    • Stroke Paternal Grandfather    • Hypertension Maternal Grandmother    • Arthritis Sister    • Obesity Sister    • Migraines Sister    • Obesity Brother    • Arthritis Maternal Grandfather        Social History     Socioeconomic History   • Marital status:      Spouse name: Not on file   • Number of children: Not on file   • Years of education: Not on file   • Highest education level: Not on file   Tobacco Use   •  "Smoking status: Never Smoker   • Smokeless tobacco: Never Used   Vaping Use   • Vaping Use: Never used   Substance and Sexual Activity   • Alcohol use: No   • Drug use: No   • Sexual activity: Yes     Partners: Male       No Known Allergies    Review of Systems:    Review of Systems   Constitutional: Negative for fatigue and fever.   HENT: Positive for postnasal drip and sinus pressure.    Respiratory: Positive for cough. Negative for shortness of breath and wheezing.    Gastrointestinal: Negative.    Psychiatric/Behavioral: Positive for depressed mood. The patient is nervous/anxious.        Vitals:    08/05/21 1140   BP: 128/84   Pulse: 67   SpO2: 98%   Weight: 135 kg (298 lb 9.6 oz)   Height: 165.1 cm (65\")   PainSc: 0-No pain     Body mass index is 49.69 kg/m².      Current Outpatient Medications:   •  amitriptyline (ELAVIL) 25 MG tablet, Take 1.5 tablets by mouth Every Night., Disp: 135 tablet, Rfl: 0  •  ferrous gluconate (FERGON) 324 MG tablet, Take 1 tablet by mouth Every Other Day. Take with food, Disp: 90 tablet, Rfl: 0  •  fluticasone (Flonase) 50 MCG/ACT nasal spray, 2 sprays into the nostril(s) as directed by provider Daily., Disp: 18.2 mL, Rfl: 2  •  hydrOXYzine (ATARAX) 25 MG tablet, Take 0.5-1 tablets by mouth 3 (Three) Times a Day As Needed (anxiety and sleep)., Disp: 30 tablet, Rfl: 2  •  loratadine (CLARITIN) 10 MG tablet, Take 1 tablet by mouth Daily., Disp: 30 tablet, Rfl: 11  •  omeprazole (PrilOSEC) 20 MG capsule, Take 1 capsule by mouth Daily., Disp: 30 capsule, Rfl: 1  •  sertraline (ZOLOFT) 100 MG tablet, Take 1 tablet by mouth Daily., Disp: 30 tablet, Rfl: 6  •  vitamin D (ERGOCALCIFEROL) 1.25 MG (91791 UT) capsule capsule, Take 1 capsule by mouth 1 (One) Time Per Week., Disp: 12 capsule, Rfl: 0  •  azithromycin (Zithromax Z-Andres) 250 MG tablet, Take 2 tablets by mouth on day 1, then 1 tablet daily on days 2-5, Disp: 6 tablet, Rfl: 0  •  brompheniramine-pseudoephedrine-DM 30-2-10 MG/5ML " syrup, Take 5 mL by mouth 4 (Four) Times a Day As Needed for Allergies., Disp: 100 mL, Rfl: 0  •  guaifenesin-dextromethorphan (MUCINEX DM)  MG tablet sustained-release 12 hour tablet, Take 1 tablet by mouth 2 (Two) Times a Day As Needed (coug)., Disp: 30 tablet, Rfl: 0    Physical Exam:    Physical Exam    Procedures    Results Review:    None    Assessment/Plan:     Problem List Items Addressed This Visit        Mental Health    Depression - Primary    Current Assessment & Plan     Patient's depression is recurrent and is moderate without psychosis. Their depression is currently in partial remission and the condition is worsening. This will be reassessed at the next regular appointment. F/U as described:After discussion we have increased her Zoloft 100 mg daily.  She will follow up with Dr. Thomas her annual exam in September..         Relevant Medications    hydrOXYzine (ATARAX) 25 MG tablet    amitriptyline (ELAVIL) 25 MG tablet    sertraline (ZOLOFT) 100 MG tablet      Other Visit Diagnoses     Viral upper respiratory tract infection        Had a rapid Covid test negative today.  Placed her on Mucinex D as well as Bromfed at night.  Z-Andres if symptoms are better by tomorrow          Plan of care reviewed with patient at the conclusion of today's visit. Education was provided regarding diagnosis and management.  Patient verbalizes understanding of and agreement with management plan.    No follow-ups on file.    Vinicio Jara MD    Please note that portions of this note may have been completed with a voice recognition program. Efforts were made to edit the dictations, but occasionally words are mistranscribed.

## 2021-08-05 NOTE — ASSESSMENT & PLAN NOTE
Patient's depression is recurrent and is moderate without psychosis. Their depression is currently in partial remission and the condition is worsening. This will be reassessed at the next regular appointment. F/U as described:After discussion we have increased her Zoloft 100 mg daily.  She will follow up with Dr. Thomas her annual exam in September..

## 2021-09-14 ENCOUNTER — LAB (OUTPATIENT)
Dept: LAB | Facility: HOSPITAL | Age: 31
End: 2021-09-14

## 2021-09-14 ENCOUNTER — OFFICE VISIT (OUTPATIENT)
Dept: FAMILY MEDICINE CLINIC | Facility: CLINIC | Age: 31
End: 2021-09-14

## 2021-09-14 VITALS
WEIGHT: 293 LBS | SYSTOLIC BLOOD PRESSURE: 124 MMHG | OXYGEN SATURATION: 98 % | HEIGHT: 65 IN | DIASTOLIC BLOOD PRESSURE: 68 MMHG | TEMPERATURE: 97.1 F | RESPIRATION RATE: 16 BRPM | HEART RATE: 91 BPM | BODY MASS INDEX: 48.82 KG/M2

## 2021-09-14 DIAGNOSIS — E55.9 VITAMIN D DEFICIENCY: ICD-10-CM

## 2021-09-14 DIAGNOSIS — D50.9 IRON DEFICIENCY ANEMIA, UNSPECIFIED IRON DEFICIENCY ANEMIA TYPE: ICD-10-CM

## 2021-09-14 DIAGNOSIS — Z00.00 PREVENTATIVE HEALTH CARE: Primary | ICD-10-CM

## 2021-09-14 DIAGNOSIS — R53.83 FATIGUE, UNSPECIFIED TYPE: ICD-10-CM

## 2021-09-14 DIAGNOSIS — F33.41 RECURRENT MAJOR DEPRESSIVE DISORDER, IN PARTIAL REMISSION (HCC): ICD-10-CM

## 2021-09-14 DIAGNOSIS — Z00.00 PREVENTATIVE HEALTH CARE: ICD-10-CM

## 2021-09-14 PROBLEM — Z20.818 STREP THROAT EXPOSURE: Status: RESOLVED | Noted: 2019-09-03 | Resolved: 2021-09-14

## 2021-09-14 PROBLEM — J02.0 STREP PHARYNGITIS: Status: RESOLVED | Noted: 2019-04-25 | Resolved: 2021-09-14

## 2021-09-14 PROBLEM — J02.9 PHARYNGITIS: Status: RESOLVED | Noted: 2019-09-03 | Resolved: 2021-09-14

## 2021-09-14 PROBLEM — M79.604 RIGHT LEG PAIN: Status: RESOLVED | Noted: 2018-03-23 | Resolved: 2021-09-14

## 2021-09-14 LAB
ALBUMIN SERPL-MCNC: 4 G/DL (ref 3.5–5.2)
ALBUMIN/GLOB SERPL: 1.1 G/DL
ALP SERPL-CCNC: 126 U/L (ref 39–117)
ALT SERPL W P-5'-P-CCNC: 24 U/L (ref 1–33)
ANION GAP SERPL CALCULATED.3IONS-SCNC: 9.4 MMOL/L (ref 5–15)
AST SERPL-CCNC: 25 U/L (ref 1–32)
BASOPHILS # BLD AUTO: 0.05 10*3/MM3 (ref 0–0.2)
BASOPHILS NFR BLD AUTO: 0.8 % (ref 0–1.5)
BILIRUB SERPL-MCNC: 0.4 MG/DL (ref 0–1.2)
BUN SERPL-MCNC: 9 MG/DL (ref 6–20)
BUN/CREAT SERPL: 15.3 (ref 7–25)
CALCIUM SPEC-SCNC: 9 MG/DL (ref 8.6–10.5)
CHLORIDE SERPL-SCNC: 102 MMOL/L (ref 98–107)
CHOLEST SERPL-MCNC: 188 MG/DL (ref 0–200)
CO2 SERPL-SCNC: 24.6 MMOL/L (ref 22–29)
CREAT SERPL-MCNC: 0.59 MG/DL (ref 0.57–1)
DEPRECATED RDW RBC AUTO: 44.8 FL (ref 37–54)
EOSINOPHIL # BLD AUTO: 0.21 10*3/MM3 (ref 0–0.4)
EOSINOPHIL NFR BLD AUTO: 3.5 % (ref 0.3–6.2)
ERYTHROCYTE [DISTWIDTH] IN BLOOD BY AUTOMATED COUNT: 15.7 % (ref 12.3–15.4)
FERRITIN SERPL-MCNC: 15.7 NG/ML (ref 13–150)
GFR SERPL CREATININE-BSD FRML MDRD: 119 ML/MIN/1.73
GLOBULIN UR ELPH-MCNC: 3.6 GM/DL
GLUCOSE SERPL-MCNC: 83 MG/DL (ref 65–99)
HBA1C MFR BLD: 4.8 % (ref 4.8–5.6)
HCT VFR BLD AUTO: 37.4 % (ref 34–46.6)
HDLC SERPL-MCNC: 55 MG/DL (ref 40–60)
HGB BLD-MCNC: 11.3 G/DL (ref 12–15.9)
IMM GRANULOCYTES # BLD AUTO: 0.03 10*3/MM3 (ref 0–0.05)
IMM GRANULOCYTES NFR BLD AUTO: 0.5 % (ref 0–0.5)
IRON 24H UR-MRATE: 62 MCG/DL (ref 37–145)
IRON SATN MFR SERPL: 11 % (ref 20–50)
LDLC SERPL CALC-MCNC: 114 MG/DL (ref 0–100)
LDLC/HDLC SERPL: 2.03 {RATIO}
LYMPHOCYTES # BLD AUTO: 1.1 10*3/MM3 (ref 0.7–3.1)
LYMPHOCYTES NFR BLD AUTO: 18.2 % (ref 19.6–45.3)
MCH RBC QN AUTO: 24.1 PG (ref 26.6–33)
MCHC RBC AUTO-ENTMCNC: 30.2 G/DL (ref 31.5–35.7)
MCV RBC AUTO: 79.7 FL (ref 79–97)
MONOCYTES # BLD AUTO: 0.38 10*3/MM3 (ref 0.1–0.9)
MONOCYTES NFR BLD AUTO: 6.3 % (ref 5–12)
NEUTROPHILS NFR BLD AUTO: 4.28 10*3/MM3 (ref 1.7–7)
NEUTROPHILS NFR BLD AUTO: 70.7 % (ref 42.7–76)
NRBC BLD AUTO-RTO: 0 /100 WBC (ref 0–0.2)
PATHOLOGY REVIEW: YES
PLATELET # BLD AUTO: 389 10*3/MM3 (ref 140–450)
PMV BLD AUTO: 11 FL (ref 6–12)
POTASSIUM SERPL-SCNC: 3.9 MMOL/L (ref 3.5–5.2)
PROT SERPL-MCNC: 7.6 G/DL (ref 6–8.5)
RBC # BLD AUTO: 4.69 10*6/MM3 (ref 3.77–5.28)
RETICS # AUTO: 0.08 10*6/MM3 (ref 0.02–0.13)
RETICS/RBC NFR AUTO: 1.72 % (ref 0.7–1.9)
SODIUM SERPL-SCNC: 136 MMOL/L (ref 136–145)
TIBC SERPL-MCNC: 557 MCG/DL (ref 298–536)
TRANSFERRIN SERPL-MCNC: 374 MG/DL (ref 200–360)
TRIGL SERPL-MCNC: 108 MG/DL (ref 0–150)
TSH SERPL DL<=0.05 MIU/L-ACNC: 1.54 UIU/ML (ref 0.27–4.2)
VLDLC SERPL-MCNC: 19 MG/DL (ref 5–40)
WBC # BLD AUTO: 6.05 10*3/MM3 (ref 3.4–10.8)

## 2021-09-14 PROCEDURE — 83036 HEMOGLOBIN GLYCOSYLATED A1C: CPT

## 2021-09-14 PROCEDURE — 82607 VITAMIN B-12: CPT

## 2021-09-14 PROCEDURE — 82306 VITAMIN D 25 HYDROXY: CPT

## 2021-09-14 PROCEDURE — 99395 PREV VISIT EST AGE 18-39: CPT | Performed by: FAMILY MEDICINE

## 2021-09-14 PROCEDURE — 3008F BODY MASS INDEX DOCD: CPT | Performed by: FAMILY MEDICINE

## 2021-09-14 PROCEDURE — 80061 LIPID PANEL: CPT

## 2021-09-14 PROCEDURE — 85045 AUTOMATED RETICULOCYTE COUNT: CPT

## 2021-09-14 PROCEDURE — 81001 URINALYSIS AUTO W/SCOPE: CPT

## 2021-09-14 PROCEDURE — 83540 ASSAY OF IRON: CPT

## 2021-09-14 PROCEDURE — 82746 ASSAY OF FOLIC ACID SERUM: CPT

## 2021-09-14 PROCEDURE — 85025 COMPLETE CBC W/AUTO DIFF WBC: CPT

## 2021-09-14 PROCEDURE — 2014F MENTAL STATUS ASSESS: CPT | Performed by: FAMILY MEDICINE

## 2021-09-14 PROCEDURE — 84466 ASSAY OF TRANSFERRIN: CPT

## 2021-09-14 PROCEDURE — 84443 ASSAY THYROID STIM HORMONE: CPT

## 2021-09-14 PROCEDURE — 80053 COMPREHEN METABOLIC PANEL: CPT

## 2021-09-14 PROCEDURE — 82728 ASSAY OF FERRITIN: CPT

## 2021-09-14 RX ORDER — BUPROPION HYDROCHLORIDE 150 MG/1
150 TABLET ORAL DAILY
Qty: 30 TABLET | Refills: 1 | Status: SHIPPED | OUTPATIENT
Start: 2021-09-14 | End: 2022-04-28 | Stop reason: SDUPTHER

## 2021-09-14 NOTE — PATIENT INSTRUCTIONS
1.  Decrease to 1/2 tablet sertraline for 2 weeks then stop    2.  Start bupropion in the morning tomorrow

## 2021-09-14 NOTE — PROGRESS NOTES
Subjective   Sasha Stewart is a 31 y.o. female.     Chief Complaint   Patient presents with   • Annual Exam   • Depression       History of Present Illness     Sasha Stewart presents today for annual physical exam and preventative care.    Sasha is here today for her annual well adult exam. I have seen her on 1 single occasion in 02/2019 to establish care. She was supposed to come back for lab review and a follow-up; however, she did not return for her appointment. Since that time, she has been seen on a few occasions by Ankush Robins PA-C here in the office for vertigo symptoms.    The patient states that she has been taking 50 mg of sertraline for 6 to 7 years and denies that it is working for her anymore and would like to try different medication. She states that the sertraline worked at one point; however, she continues to get depressed; however, it is not as badly and the anxiety seems to be what affects her. She has only ever tried sertraline. She reports that she has not had energy while doing tasks as well at motivation to perform tasks. She states that she seems to become tired more frequently and she does not want to do anything when someone invites her out. She denies getting pleasure from things anymore. She works and and has 4 children at the ages of [age] two 4, 7, and 10. She is now  and her and her ex- share 50/50 custody, which are stressful and increase her depression and anxiety. She states that she used to love to do makeup and had a hobby of gecos and snakes. She states that she has a regular diet and denies avoiding any particular foods. She denies having increased fatigue after eating. She denies speaking with a therapist in the past and is open to speaking with one.     She states that she has been taking the iron supplement 1 to 2 times weekly secondary to it making her nauseous when she took it daily or every other day, even when she takes it with food. She is taking  amitriptyline at night for sleep, which does help with sleep and will wake up feeling rested. She states that she only took the vitamin D once weekly until she completed the bottle. She denies taking Prilosec. She takes hydroxyzine as needed for anxiety at night, which is helpful. She also takes Claritin daily and Flonase as needed.     She reports that she has her menses, which are heavy, and she has an appointment scheduled with a gynecologist in 2021. She denies having shortness of breath or chest pain. She states that her abdominal pain has improved and denies having any swelling in her hands and feet.     She denies having her COVID vaccines and denies wanting to get them. She reports her last surgery was a  section and tubal ligation, which is still effective.    This patient is accompanied by their self who contributes to the history of their care.    The following portions of the patient's history were reviewed and updated as appropriate: allergies, current medications, past family history, past medical history, past social history, past surgical history and problem list.    Active Ambulatory Problems     Diagnosis Date Noted   • Iron deficiency anemia 2021   • Vitamin D deficiency 2021   • Depression      Resolved Ambulatory Problems     Diagnosis Date Noted   • Dichorionic diamniotic twin gestation 2016   • Uterine size date discrepancy 2016   • Previous  section complicating pregnancy 2016   • Dichorionic diamniotic twin pregnancy in third trimester 2016   • Right leg pain 2018   • Strep pharyngitis 2019   • Pharyngitis 2019   • Strep throat exposure 2019     Past Medical History:   Diagnosis Date   • Anemia    • Asthma    • Fibroid    • PID (pelvic inflammatory disease)      Past Surgical History:   Procedure Laterality Date   •  SECTION      2013   •  SECTION WITH TUBAL Bilateral 2016    Procedure:  " SECTION REPEAT WITH TUBAL;  Surgeon: Chrissy Hampton MD;  Location: [facility] LABOR DELIVERY;  Service:    • GASTRIC SLEEVE LAPAROSCOPIC     • TUBAL ABDOMINAL LIGATION     • WISDOM TOOTH EXTRACTION       Family History   Problem Relation Age of Onset   • Diabetes Mother    • Hypertension Mother    • Arthritis Mother    • Obesity Mother    • Hypertension Father    • Coronary artery disease Paternal Grandfather    • Stroke Paternal Grandfather    • Hypertension Maternal Grandmother    • Arthritis Sister    • Obesity Sister    • Migraines Sister    • Obesity Brother    • Arthritis Maternal Grandfather      Social History     Socioeconomic History   • Marital status:      Spouse name: Not on file   • Number of children: Not on file   • Years of education: Not on file   • Highest education level: Not on file   Tobacco Use   • Smoking status: Never Smoker   • Smokeless tobacco: Never Used   Vaping Use   • Vaping Use: Never used   Substance and Sexual Activity   • Alcohol use: No   • Drug use: No   • Sexual activity: Yes     Partners: Male       Review of Systems  See Physical ROS scanned into chart    Objective   Blood pressure 124/68, pulse 91, temperature 97.1 °F (36.2 °C), resp. rate 16, height 165.1 cm (65\"), weight (!) 137 kg (302 lb 9.6 oz), SpO2 98 %, not currently breastfeeding.  Nursing note reviewed  Physical Exam  General: Patient is well-nourished, well-developed, and in no acute distress.  HEENT: Normocephalic with no contusions noted, no ptosis or palsy. Pupils equally round and reactive to light, extraocular movements intact. Patient holds steady gaze, can follow to midline. Hearing grossly normal without deficit, exterior auricles normal, tympanic membranes normal without erythema or bulging.  Lymphatic: Posterior auricular, cervical, submandibular, supraclavicular, axillary lymphatic sites palpated without abnormality  Cardiovascular: Normal study rate without ectopy. PMI palpated, normal. " Normal S1, S2. No murmurs rubs or gallops.  Respiratory: No tenderness to palpation on the chest wall, lungs clear to auscultation bilaterally, no wheezes, rales, or rhonchi. No pleural friction rubs.  Gastrointestinal: Bowel sounds present, normoactive globally. No bruit noted. Nontender, nondistended. Normal percussive exam, no hepatomegaly, no splenomegaly. No hernias, scars, gross lesions.  Extremities: No cyanosis or edema, 2+ pulses bilaterally, reflexes normal. Capillary refill time normal.  MSK: Normal gait and station. 5/5 strength globally.  Neuro: Cranial nerves II-XII grossly intact. Patient alert and oriented x3.  PHQ-9 Depression Screening  Little interest or pleasure in doing things?     Feeling down, depressed, or hopeless?     Trouble falling or staying asleep, or sleeping too much?     Feeling tired or having little energy?     Poor appetite or overeating?     Feeling bad about yourself - or that you are a failure or have let yourself or your family down?     Trouble concentrating on things, such as reading the newspaper or watching television?     Moving or speaking so slowly that other people could have noticed? Or the opposite - being so fidgety or restless that you have been moving around a lot more than usual?     Thoughts that you would be better off dead, or of hurting yourself in some way?     PHQ-9 Total Score     If you checked off any problems, how difficult have these problems made it for you to do your work, take care of things at home, or get along with other people?       Procedures  Assessment/Plan   Problem List Items Addressed This Visit        Endocrine and Metabolic    Vitamin D deficiency    Current Assessment & Plan     Completed 12 weeks of 23514V weekly in April         Relevant Orders    Comprehensive Metabolic Panel (Completed)    Vitamin D 25 Hydroxy (Completed)       Hematology and Neoplasia    Iron deficiency anemia    Current Assessment & Plan     Currently taking  ferrous gluconate 2-3x per week. Last check Hgb 9.3.         Relevant Orders    Peripheral Blood Smear (Completed)    Reticulocytes (Completed)    Vitamin B12 (Completed)    Folate (Completed)    Ferritin (Completed)    Iron Profile (Completed)    CBC & Differential (Completed)       Mental Health    Depression    Relevant Medications    buPROPion XL (Wellbutrin XL) 150 MG 24 hr tablet      Other Visit Diagnoses     Preventative health care    -  Primary    Relevant Orders    Lipid Panel (Completed)    Hemoglobin A1c (Completed)    Urinalysis With Microscopic If Indicated (No Culture) - Urine, Clean Catch (Completed)    TSH (Completed)    Fatigue, unspecified type              1. Depression  - She states that the sertraline initially helped; however, it no longer does.  She apparently has stress at home secondary to her and her ex- having 50/50 custody of their children. We will check her blood work today. She will decrease to taking 25 mg of sertraline for 2 weeks and begin taking the Wellbutrin now.     2. Iron deficiency  - She has currently been taking ferrous gluconate approximately 1 to 2 times weekly secondary to it causing nausea. She states that she has regular menses; however, they are heavy. She is scheduled to see the gynecologist in 10/2021. We will check her blood work today.    3. Preventative healthcare  - We will check her blood work. She denies wanting to get the COVID vaccine and I highly recommended that she get it.     4. Fatigue  - She no longer wants to participate in activities, has low motivation to do anything, and does not get any pleasure from things. We will check her blood work today.     See patient diagnoses and orders along with patient instructions for assessment, plan, and changes to care for patient.    The preventative exam has been reviewed in detail.  The patient has been fully counseled on preventative guidelines for vaccines, cancer screenings, and other health  maintenance needs. The patient was counseled on maintaining a lifestyle to promote good health and to minimize chronic diseases.  The patient has been assisted with scheduling healthcare procedures for the coming year and given a written document outlining these recommendations. Age-appropriate screening measures have been ordered for the patient today as indicated above.    Patient Instructions   1.  Decrease to 1/2 tablet sertraline for 2 weeks then stop    2.  Start bupropion in the morning tomorrow      Return in about 6 weeks (around 10/26/2021) for (with PHQ9);.    Ambulatory progress note signed and attested to by Pino Willis D.O.           Current Outpatient Medications:   •  amitriptyline (ELAVIL) 25 MG tablet, Take 1.5 tablets by mouth Every Night., Disp: 135 tablet, Rfl: 0  •  ferrous gluconate (FERGON) 324 MG tablet, Take 1 tablet by mouth Every Other Day. Take with food, Disp: 90 tablet, Rfl: 0  •  fluticasone (Flonase) 50 MCG/ACT nasal spray, 2 sprays into the nostril(s) as directed by provider Daily., Disp: 18.2 mL, Rfl: 2  •  hydrOXYzine (ATARAX) 25 MG tablet, Take 0.5-1 tablets by mouth 3 (Three) Times a Day As Needed (anxiety and sleep)., Disp: 30 tablet, Rfl: 2  •  loratadine (CLARITIN) 10 MG tablet, Take 1 tablet by mouth Daily., Disp: 30 tablet, Rfl: 11  •  buPROPion XL (Wellbutrin XL) 150 MG 24 hr tablet, Take 1 tablet by mouth Daily., Disp: 30 tablet, Rfl: 1         Transcribed from ambient dictation for Pino Willis DO by Yoandy Cordoba.  09/14/21   15:20 EDT    I have personally performed the services described in this document as transcribed by the above individual, and it is both accurate and complete.  Pino Willis DO  9/21/2021  10:39 EDT

## 2021-09-15 LAB
25(OH)D3 SERPL-MCNC: 16.4 NG/ML
BACTERIA UR QL AUTO: ABNORMAL /HPF
BILIRUB UR QL STRIP: NEGATIVE
CLARITY UR: CLEAR
COLOR UR: YELLOW
FOLATE SERPL-MCNC: 4.62 NG/ML (ref 4.78–24.2)
GLUCOSE UR STRIP-MCNC: NEGATIVE MG/DL
HGB UR QL STRIP.AUTO: ABNORMAL
HYALINE CASTS UR QL AUTO: ABNORMAL /LPF
KETONES UR QL STRIP: NEGATIVE
LAB AP CASE REPORT: NORMAL
LAB AP CLINICAL INFORMATION: NORMAL
LEUKOCYTE ESTERASE UR QL STRIP.AUTO: NEGATIVE
MUCOUS THREADS URNS QL MICRO: ABNORMAL /HPF
NITRITE UR QL STRIP: NEGATIVE
PATH REPORT.FINAL DX SPEC: NORMAL
PATH REPORT.GROSS SPEC: NORMAL
PH UR STRIP.AUTO: 6 [PH] (ref 5–8)
PROT UR QL STRIP: ABNORMAL
RBC # UR: ABNORMAL /HPF
REF LAB TEST METHOD: ABNORMAL
SP GR UR STRIP: 1.02 (ref 1–1.03)
SQUAMOUS #/AREA URNS HPF: ABNORMAL /HPF
UROBILINOGEN UR QL STRIP: ABNORMAL
VIT B12 BLD-MCNC: 319 PG/ML (ref 211–946)
WBC UR QL AUTO: ABNORMAL /HPF

## 2021-10-12 ENCOUNTER — OFFICE VISIT (OUTPATIENT)
Dept: FAMILY MEDICINE CLINIC | Facility: CLINIC | Age: 31
End: 2021-10-12

## 2021-10-12 VITALS
HEART RATE: 117 BPM | OXYGEN SATURATION: 96 % | TEMPERATURE: 97.3 F | WEIGHT: 293 LBS | DIASTOLIC BLOOD PRESSURE: 84 MMHG | BODY MASS INDEX: 48.82 KG/M2 | SYSTOLIC BLOOD PRESSURE: 126 MMHG | HEIGHT: 65 IN

## 2021-10-12 DIAGNOSIS — B34.9 VIRAL ILLNESS: Primary | ICD-10-CM

## 2021-10-12 PROCEDURE — 99213 OFFICE O/P EST LOW 20 MIN: CPT | Performed by: NURSE PRACTITIONER

## 2021-10-12 NOTE — PROGRESS NOTES
"Chief Complaint  Nausea (Pt states her son had a stomach virus last week and now she has started to develop symptoms.) and Diarrhea    Subjective          Sasha Stewart presents to [facility]     History of Present Illness  She has been sick since last Thursday.  Her son had it first last week and then she got it.  Nausea came first then diarrhea and then stomach aches.  It was worse over the weekend.  It did start to get better.  She has been having more stomach aches now then before.  She has also been having nausea still.  The diarrhea is not as often.  She has had one episode of diarrhea today.  She had 2 episodes yesterday.  Her stool is very soft.  The pain is located in the epigastric region or in the lower abdomen.  The pain is described as stabbing.  Sometimes it is aching.  When she is up and walking a lot, it makes it worse.  Nothing makes it better.  It just goes away on its own.  She hasn't had vomiting. Her son got better after 5 days.  She hasn't been taking any medications for this.  She denies fever, chills, or body aches.  Her son has had a negative COVID test.      Objective   Vital Signs:   /84   Pulse 117   Temp 97.3 °F (36.3 °C)   Ht 165.1 cm (65\")   Wt (!) 137 kg (301 lb)   SpO2 96%   BMI 50.09 kg/m²       Physical Exam  Vitals reviewed.   Constitutional:       Appearance: Normal appearance. She is obese.   HENT:      Head: Normocephalic and atraumatic.   Cardiovascular:      Rate and Rhythm: Normal rate and regular rhythm.      Heart sounds: Normal heart sounds.   Pulmonary:      Effort: Pulmonary effort is normal.      Breath sounds: Normal breath sounds.   Abdominal:      General: Bowel sounds are normal.      Palpations: Abdomen is soft.      Tenderness: There is abdominal tenderness ( Generalized). There is no guarding or rebound.   Skin:     General: Skin is warm and dry.   Neurological:      General: No focal deficit present.      Mental " Status: She is alert and oriented to person, place, and time.   Psychiatric:         Mood and Affect: Mood normal.         Behavior: Behavior normal.         Thought Content: Thought content normal.         Judgment: Judgment normal.        Result Review :                 Assessment and Plan    Diagnoses and all orders for this visit:    1. Viral illness (Primary) -patient does appear to be improving.  This was likely gastroenteritis.  Her son had the same thing.  He was tested for Covid and was negative.  --Encourage patient to drink plenty of fluids.  --Discussed following a bland diet.  --She will not return to work until Friday.  --Perform good hand hygiene.  --If symptoms worsen or do not improve, return to clinic.        Follow Up   Return if symptoms worsen or fail to improve.  Patient was given instructions and counseling regarding her condition or for health maintenance advice. Please see specific information pulled into the AVS if appropriate.

## 2021-10-12 NOTE — PATIENT INSTRUCTIONS
Famotidine 20mg twice a day.    Viral Gastroenteritis, Adult    Viral gastroenteritis is also known as the stomach flu. This condition may affect your stomach, small intestine, and large intestine. It can cause sudden watery diarrhea, fever, and vomiting. This condition is caused by many different viruses. These viruses can be passed from person to person very easily (are contagious).  Diarrhea and vomiting can make you feel weak and cause you to become dehydrated. You may not be able to keep fluids down. Dehydration can make you tired and thirsty, cause you to have a dry mouth, and decrease how often you urinate. It is important to replace the fluids that you lose from diarrhea and vomiting.  What are the causes?  Gastroenteritis is caused by many viruses, including rotavirus and norovirus. Norovirus is the most common cause in adults. You can get sick after being exposed to the viruses from other people. You can also get sick by:  · Eating food, drinking water, or touching a surface contaminated with one of these viruses.  · Sharing utensils or other personal items with an infected person.  What increases the risk?  You are more likely to develop this condition if you:  · Have a weak body defense system (immune system).  · Live with one or more children who are younger than 2 years old.  · Live in a nursing home.  · Travel on cruise ships.  What are the signs or symptoms?  Symptoms of this condition start suddenly 1-3 days after exposure to a virus. Symptoms may last for a few days or for as long as a week. Common symptoms include watery diarrhea and vomiting. Other symptoms include:  · Fever.  · Headache.  · Fatigue.  · Pain in the abdomen.  · Chills.  · Weakness.  · Nausea.  · Muscle aches.  · Loss of appetite.  How is this diagnosed?  This condition is diagnosed with a medical history and physical exam. You may also have a stool test to check for viruses or other infections.  How is this treated?  This  condition typically goes away on its own. The focus of treatment is to prevent dehydration and restore lost fluids (rehydration). This condition may be treated with:  · An oral rehydration solution (ORS) to replace important salts and minerals (electrolytes) in your body. Take this if told by your health care provider. This is a drink that is sold at pharmacies and retail stores.  · Medicines to help with your symptoms.  · Probiotic supplements to reduce symptoms of diarrhea.  · Fluids given through an IV, if dehydration is severe.  Older adults and people with other diseases or a weak immune system are at higher risk for dehydration.  Follow these instructions at home:    Eating and drinking    · Take an ORS as told by your health care provider.  · Drink clear fluids in small amounts as you are able. Clear fluids include:  ? Water.  ? Ice chips.  ? Diluted fruit juice.  ? Low-calorie sports drinks.  · Drink enough fluid to keep your urine pale yellow.  · Eat small amounts of healthy foods every 3-4 hours as you are able. This may include whole grains, fruits, vegetables, lean meats, and yogurt.  · Avoid fluids that contain a lot of sugar or caffeine, such as energy drinks, sports drinks, and soda.  · Avoid spicy or fatty foods.  · Avoid alcohol.    General instructions  · Wash your hands often, especially after having diarrhea or vomiting. If soap and water are not available, use hand .  · Make sure that all people in your household wash their hands well and often.  · Take over-the-counter and prescription medicines only as told by your health care provider.  · Rest at home while you recover.  · Watch your condition for any changes.  · Take a warm bath to relieve any burning or pain from frequent diarrhea episodes.  · Keep all follow-up visits as told by your health care provider. This is important.  Contact a health care provider if you:  · Cannot keep fluids down.  · Have symptoms that get worse.  · Have  new symptoms.  · Feel light-headed or dizzy.  · Have muscle cramps.  Get help right away if you:  · Have chest pain.  · Feel extremely weak or you faint.  · See blood in your vomit.  · Have vomit that looks like coffee grounds.  · Have bloody or black stools or stools that look like tar.  · Have a severe headache, a stiff neck, or both.  · Have a rash.  · Have severe pain, cramping, or bloating in your abdomen.  · Have trouble breathing or you are breathing very quickly.  · Have a fast heartbeat.  · Have skin that feels cold and clammy.  · Feel confused.  · Have pain when you urinate.  · Have signs of dehydration, such as:  ? Dark urine, very little urine, or no urine.  ? Cracked lips.  ? Dry mouth.  ? Sunken eyes.  ? Sleepiness.  ? Weakness.  Summary  · Viral gastroenteritis is also known as the stomach flu. It can cause sudden watery diarrhea, fever, and vomiting.  · This condition can be passed from person to person very easily (is contagious).  · Take an ORS if told by your health care provider. This is a drink that is sold at pharmacies and retail stores.  · Wash your hands often, especially after having diarrhea or vomiting. If soap and water are not available, use hand .  This information is not intended to replace advice given to you by your health care provider. Make sure you discuss any questions you have with your health care provider.  Document Revised: 06/05/2020 Document Reviewed: 10/23/2019  [employer] Patient Education © 2021 [employer]

## 2021-12-03 ENCOUNTER — OFFICE VISIT (OUTPATIENT)
Dept: FAMILY MEDICINE CLINIC | Facility: CLINIC | Age: 31
End: 2021-12-03

## 2021-12-03 VITALS
WEIGHT: 293 LBS | DIASTOLIC BLOOD PRESSURE: 88 MMHG | HEART RATE: 93 BPM | BODY MASS INDEX: 48.82 KG/M2 | HEIGHT: 65 IN | OXYGEN SATURATION: 98 % | SYSTOLIC BLOOD PRESSURE: 132 MMHG | TEMPERATURE: 97.8 F

## 2021-12-03 DIAGNOSIS — R19.7 ACUTE DIARRHEA: Primary | ICD-10-CM

## 2021-12-03 DIAGNOSIS — B34.9 VIRAL SYNDROME: ICD-10-CM

## 2021-12-03 PROCEDURE — 99213 OFFICE O/P EST LOW 20 MIN: CPT | Performed by: PHYSICIAN ASSISTANT

## 2021-12-03 RX ORDER — ONDANSETRON 4 MG/1
4 TABLET, ORALLY DISINTEGRATING ORAL EVERY 8 HOURS PRN
Qty: 15 TABLET | Refills: 0 | Status: SHIPPED | OUTPATIENT
Start: 2021-12-03 | End: 2021-12-16

## 2021-12-03 NOTE — PROGRESS NOTES
"    Chief Complaint   Patient presents with   • Diarrhea     Pt states shes been experiencing these symptoms  and they started to get better and then suddenly worse   • Nausea   • Abdominal Pain   • Fatigue   • Headache     Pt states she had a fever that broke Wednesday morning but other symptoms have been consistent       HPI     Sasha Stewart is a pleasant 31 y.o. female who presents for evaluation of \"chief complaint.\"     Patient c/o nausea, diarrhea starting 5 days ago. Having 5-6 episodes diarrhea/day currently which has slowed down. She is drinking plenty of water. Stomach feels unsettled, sometimes painful. Her son had vomiting, stomach pain and diarrhea on Saturday. Her ex-'s family has also had a GI illness recently. Her  was tested for COVID and was negative. She had chills and felt like she had a fever until 2 days ago. Daily headaches. Sometimes discomfort is relieved by BM.     Past Medical History:   Diagnosis Date   • Anemia    • Asthma    • Depression    • Fibroid    • PID (pelvic inflammatory disease)        Past Surgical History:   Procedure Laterality Date   •  SECTION      2013   •  SECTION WITH TUBAL Bilateral 2016    Procedure:  SECTION REPEAT WITH TUBAL;  Surgeon: Chrissy Hampton MD;  Location: [facility] LABOR DELIVERY;  Service:    • GASTRIC SLEEVE LAPAROSCOPIC     • TUBAL ABDOMINAL LIGATION     • WISDOM TOOTH EXTRACTION         Family History   Problem Relation Age of Onset   • Diabetes Mother    • Hypertension Mother    • Arthritis Mother    • Obesity Mother    • Hypertension Father    • Coronary artery disease Paternal Grandfather    • Stroke Paternal Grandfather    • Hypertension Maternal Grandmother    • Arthritis Sister    • Obesity Sister    • Migraines Sister    • Obesity Brother    • Arthritis Maternal Grandfather        Social History     Socioeconomic History   • Marital status:    Tobacco Use   • Smoking status: Never Smoker "   • Smokeless tobacco: Never Used   Vaping Use   • Vaping Use: Never used   Substance and Sexual Activity   • Alcohol use: No   • Drug use: No   • Sexual activity: Yes     Partners: Male       No Known Allergies    ROS    Review of Systems   Constitutional: Negative for chills and fever.   Respiratory: Negative for cough and shortness of breath.    Gastrointestinal: Positive for abdominal pain and diarrhea. Negative for blood in stool and vomiting.   Neurological: Negative for dizziness, syncope and light-headedness.       Vitals:    12/03/21 1621   BP: 132/88   Pulse: 93   Temp: 97.8 °F (36.6 °C)   SpO2: 98%     Body mass index is 50.12 kg/m².      Current Outpatient Medications:   •  amitriptyline (ELAVIL) 25 MG tablet, Take 1.5 tablets by mouth Every Night., Disp: 135 tablet, Rfl: 0  •  buPROPion XL (Wellbutrin XL) 150 MG 24 hr tablet, Take 1 tablet by mouth Daily., Disp: 30 tablet, Rfl: 1  •  ferrous gluconate (FERGON) 324 MG tablet, Take 1 tablet by mouth Every Other Day. Take with food, Disp: 90 tablet, Rfl: 0  •  fluticasone (Flonase) 50 MCG/ACT nasal spray, 2 sprays into the nostril(s) as directed by provider Daily. (Patient taking differently: 2 sprays into the nostril(s) as directed by provider As Needed.), Disp: 18.2 mL, Rfl: 2  •  hydrOXYzine (ATARAX) 25 MG tablet, Take 0.5-1 tablets by mouth 3 (Three) Times a Day As Needed (anxiety and sleep)., Disp: 30 tablet, Rfl: 2  •  loratadine (CLARITIN) 10 MG tablet, Take 1 tablet by mouth Daily., Disp: 30 tablet, Rfl: 11  •  azithromycin (Zithromax Z-Andres) 250 MG tablet, Take 2 tablets by mouth on day 1, then 1 tablet daily on days 2-5, Disp: 6 tablet, Rfl: 0  •  benzonatate (Tessalon Perles) 100 MG capsule, Take 1 capsule by mouth 3 (Three) Times a Day As Needed for Cough., Disp: 30 capsule, Rfl: 0  •  ferrous gluconate 324 (37.5 Fe) MG tablet tablet, , Disp: , Rfl:   •  fluconazole (Diflucan) 200 MG tablet, Take 1 tablet by mouth Daily., Disp: 2 tablet, Rfl:  0  •  pseudoephedrine-guaifenesin (MUCINEX D)  MG per 12 hr tablet, Take 1 tablet by mouth Every 12 (Twelve) Hours., Disp: 20 tablet, Rfl: 0    PE    Physical Exam  Vitals reviewed.   Constitutional:       General: She is not in acute distress.     Appearance: She is well-developed.   HENT:      Head: Normocephalic and atraumatic.      Mouth/Throat:      Mouth: Mucous membranes are moist.   Eyes:      Conjunctiva/sclera: Conjunctivae normal.   Cardiovascular:      Rate and Rhythm: Normal rate and regular rhythm.      Heart sounds: Normal heart sounds. No murmur heard.      Pulmonary:      Effort: Pulmonary effort is normal.      Breath sounds: Normal breath sounds.   Abdominal:      Palpations: Abdomen is soft.      Tenderness: There is generalized abdominal tenderness (mild). There is no guarding or rebound.   Musculoskeletal:      Cervical back: Normal range of motion.   Skin:     General: Skin is warm and dry.      Capillary Refill: Capillary refill takes less than 2 seconds.   Neurological:      Mental Status: She is alert.      Gait: Gait normal.   Psychiatric:         Speech: Speech normal.         Behavior: Behavior normal.          A/P    Problem List Items Addressed This Visit     None      Visit Diagnoses     Acute diarrhea    -  Primary  -Discussed likely viral etiology given her history of multiple contacts with similar symptoms  -Most likely self-limited.  Emphasized fluid and electrolyte replacement, bland diet, Imodium as needed  -Patient was advised to present to the emergency room if she has signs or symptoms of dehydration which we discussed in detail.  -Prescription for Zofran to use as needed for nausea      Viral syndrome              Plan of care was reviewed with patient at the conclusion of today's visit. Education was provided regarding diagnoses, management, prescribed or recommended OTC products, and the importance of compliance with follow-up appointments. The patient was counseled  regarding the risks, benefits, and possible side-effects of treatment. I advised the patient to keep me informed of any acute changes in their status including new, worsening, or persistent symptoms. Patient expresses understanding and agreement with the management plan.        MARILOU Howell

## 2021-12-03 NOTE — PATIENT INSTRUCTIONS
-Sip pedialyte or low sugar gatorade to replace electrolytes  -Keep your urine a light to clear color  -Use imodium OTC as needed for diarrhea  -Call or return if diarrhea persists for more than 2 weeks or you have severe abdominal pain, vomiting, or other worsening symptoms  -Use zofran as needed for nausea  -Alternate Tylenol 500 mg and Ibuprofen 400 mg every 4 hours as needed for fever and body aches      [place] Diet  A bland diet consists of foods that are often soft and do not have a lot of fat, fiber, or extra seasonings. Foods without fat, fiber, or seasoning are easier for the body to digest. They are also less likely to irritate your mouth, throat, stomach, and other parts of your digestive system. A bland diet is sometimes called a BRAT diet.  What is my plan?  Your health care provider or food and nutrition specialist (dietitian) may recommend specific changes to your diet to prevent symptoms or to treat your symptoms. These changes may include:  · Eating small meals often.  · Cooking food until it is soft enough to chew easily.  · Chewing your food well.  · Drinking fluids slowly.  · Not eating foods that are very spicy, sour, or fatty.  · Not eating citrus fruits, such as oranges and grapefruit.  What do I need to know about this diet?  · Eat a variety of foods from the bland diet food list.  · Do not follow a bland diet longer than needed.  · Ask your health care provider whether you should take vitamins or supplements.  What foods can I eat?  Grains    Hot cereals, such as cream of wheat. Rice. Bread, crackers, or tortillas made from refined white flour.  Vegetables  Canned or cooked vegetables. Mashed or boiled potatoes.  Fruits    Bananas. Applesauce. Other types of cooked or canned fruit with the skin and seeds removed, such as canned peaches or pears.  Meats and other proteins    Scrambled eggs. Creamy peanut butter or other nut butters. Lean, well-cooked meats, such as chicken or fish. Tofu. Soups  or broths.  Dairy  Low-fat dairy products, such as milk, cottage cheese, or yogurt.  Beverages    Water. Herbal tea. Apple juice.  Fats and oils  Mild salad dressings. [place]ola or olive oil.  Sweets and desserts  Pudding. Custard. Fruit gelatin. Ice cream.  The items listed above may not be a complete list of recommended foods and beverages. Contact a dietitian for more options.  What foods are not recommended?  Grains  Whole grain breads and cereals.  Vegetables  Raw vegetables.  Fruits  Raw fruits, especially citrus, berries, or dried fruits.  Dairy  Whole fat dairy foods.  Beverages  Caffeinated drinks. Alcohol.  Seasonings and condiments  Strongly flavored seasonings or condiments. Hot sauce. Salsa.  Other foods  Spicy foods. Fried foods. Sour foods, such as pickled or fermented foods. Foods with high sugar content. Foods high in fiber.  The items listed above may not be a complete list of foods and beverages to avoid. Contact a dietitian for more information.  Summary  · A bland diet consists of foods that are often soft and do not have a lot of fat, fiber, or extra seasonings.  · Foods without fat, fiber, or seasoning are easier for the body to digest.  · Check with your health care provider to see how long you should follow this diet plan. It is not meant to be followed for long periods.  This information is not intended to replace advice given to you by your health care provider. Make sure you discuss any questions you have with your health care provider.  Document Revised: 01/16/2019 Document Reviewed: 01/16/2019  [employer] Patient Education © 2021 Elsevier Inc.    Diarrhea, Adult  Diarrhea is frequent loose and watery bowel movements. Diarrhea can make you feel weak and cause you to become dehydrated. Dehydration can make you tired and thirsty, cause you to have a dry mouth, and decrease how often you urinate.  Diarrhea typically lasts 2-3 days. However, it can last longer if it is a sign of something more  serious. It is important to treat your diarrhea as told by your health care provider.  Follow these instructions at home:  Eating and drinking         Follow these recommendations as told by your health care provider:  · Take an oral rehydration solution (ORS). This is an over-the-counter medicine that helps return your body to its normal balance of nutrients and water. It is found at pharmacies and retail stores.  · Drink plenty of fluids, such as water, ice chips, diluted fruit juice, and low-calorie sports drinks. You can drink milk also, if desired.  · Avoid drinking fluids that contain a lot of sugar or caffeine, such as energy drinks, sports drinks, and soda.  · Eat bland, easy-to-digest foods in small amounts as you are able. These foods include bananas, applesauce, rice, lean meats, toast, and crackers.  · Avoid alcohol.  · Avoid spicy or fatty foods.    Medicines  · Take over-the-counter and prescription medicines only as told by your health care provider.  · If you were prescribed an antibiotic medicine, take it as told by your health care provider. Do not stop using the antibiotic even if you start to feel better.  General instructions    · Wash your hands often using soap and water. If soap and water are not available, use a hand . Others in the household should wash their hands as well. Hands should be washed:  ? After using the toilet or changing a diaper.  ? Before preparing, cooking, or serving food.  ? While caring for a sick person or while visiting someone in a hospital.  · Drink enough fluid to keep your urine pale yellow.  · Rest at home while you recover.  · Watch your condition for any changes.  · Take a warm bath to relieve any burning or pain from frequent diarrhea episodes.  · Keep all follow-up visits as told by your health care provider. This is important.    Contact a health care provider if:  · You have a fever.  · Your diarrhea gets worse.  · You have new symptoms.  · You  cannot keep fluids down.  · You feel light-headed or dizzy.  · You have a headache.  · You have muscle cramps.  Get help right away if:  · You have chest pain.  · You feel extremely weak or you faint.  · You have bloody or black stools or stools that look like tar.  · You have severe pain, cramping, or bloating in your abdomen.  · You have trouble breathing or you are breathing very quickly.  · Your heart is beating very quickly.  · Your skin feels cold and clammy.  · You feel confused.  · You have signs of dehydration, such as:  ? Dark urine, very little urine, or no urine.  ? Cracked lips.  ? Dry mouth.  ? Sunken eyes.  ? Sleepiness.  ? Weakness.  Summary  · Diarrhea is frequent loose and watery bowel movements. Diarrhea can make you feel weak and cause you to become dehydrated.  · Drink enough fluids to keep your urine pale yellow.  · Make sure that you wash your hands after using the toilet. If soap and water are not available, use hand .  · Contact a health care provider if your diarrhea gets worse or you have new symptoms.  · Get help right away if you have signs of dehydration.  This information is not intended to replace advice given to you by your health care provider. Make sure you discuss any questions you have with your health care provider.  Document Revised: 05/05/2020 Document Reviewed: 05/24/2019  [employer] Patient Education © 2021 [employer]

## 2021-12-16 ENCOUNTER — OFFICE VISIT (OUTPATIENT)
Dept: FAMILY MEDICINE CLINIC | Facility: CLINIC | Age: 31
End: 2021-12-16

## 2021-12-16 VITALS
BODY MASS INDEX: 48.82 KG/M2 | DIASTOLIC BLOOD PRESSURE: 90 MMHG | WEIGHT: 293 LBS | OXYGEN SATURATION: 97 % | HEART RATE: 117 BPM | SYSTOLIC BLOOD PRESSURE: 136 MMHG | HEIGHT: 65 IN

## 2021-12-16 DIAGNOSIS — R05.9 COUGH: Primary | ICD-10-CM

## 2021-12-16 LAB
EXPIRATION DATE: NORMAL
FLUAV AG NPH QL: NEGATIVE
FLUBV AG NPH QL: NEGATIVE
INTERNAL CONTROL: NORMAL
Lab: NORMAL

## 2021-12-16 PROCEDURE — 99213 OFFICE O/P EST LOW 20 MIN: CPT | Performed by: INTERNAL MEDICINE

## 2021-12-16 PROCEDURE — U0003 INFECTIOUS AGENT DETECTION BY NUCLEIC ACID (DNA OR RNA); SEVERE ACUTE RESPIRATORY SYNDROME CORONAVIRUS 2 (SARS-COV-2) (CORONAVIRUS DISEASE [COVID-19]), AMPLIFIED PROBE TECHNIQUE, MAKING USE OF HIGH THROUGHPUT TECHNOLOGIES AS DESCRIBED BY CMS-2020-01-R: HCPCS | Performed by: INTERNAL MEDICINE

## 2021-12-16 PROCEDURE — 87804 INFLUENZA ASSAY W/OPTIC: CPT | Performed by: INTERNAL MEDICINE

## 2021-12-16 RX ORDER — GUAIFENESIN, PSEUDOEPHEDRINE HYDROCHLORIDE 600; 60 MG/1; MG/1
1 TABLET, EXTENDED RELEASE ORAL EVERY 12 HOURS
Qty: 20 TABLET | Refills: 0 | Status: SHIPPED | OUTPATIENT
Start: 2021-12-16 | End: 2022-09-23

## 2021-12-16 RX ORDER — FLUCONAZOLE 200 MG/1
200 TABLET ORAL DAILY
Qty: 2 TABLET | Refills: 0 | Status: SHIPPED | OUTPATIENT
Start: 2021-12-16 | End: 2022-04-28

## 2021-12-16 RX ORDER — FERROUS GLUCONATE 324(37.5)
TABLET ORAL
COMMUNITY
Start: 2021-12-16 | End: 2022-04-28

## 2021-12-16 RX ORDER — BENZONATATE 100 MG/1
100 CAPSULE ORAL 3 TIMES DAILY PRN
Qty: 30 CAPSULE | Refills: 0 | Status: SHIPPED | OUTPATIENT
Start: 2021-12-16 | End: 2022-09-23

## 2021-12-16 RX ORDER — AZITHROMYCIN 250 MG/1
TABLET, FILM COATED ORAL
Qty: 6 TABLET | Refills: 0 | Status: SHIPPED | OUTPATIENT
Start: 2021-12-16 | End: 2022-04-28

## 2021-12-16 NOTE — PROGRESS NOTES
"Sasha Stewart  1990  [id number]  Patient Care Team:  Pino Willis DO as PCP - General (Family Medicine)    Sasha Stewart is a 31 y.o. female here today for follow up.     This patient is accompanied by their self who contributes to the history of their care.    Chief Complaint:    Chief Complaint   Patient presents with   • Cough     Productive cough   • Shortness of Breath   • Nasal Congestion       History of Present Illness:  I have reviewed and/or updated the patient's past medical, past surgical, family, social history, problem list and allergies as appropriate.   This lady who is unvaccinated with for influenza or Covid presents with a 2-day history of cough productive of some yellow sputum shortness of breath.  Subjective fevers.  No sore throat no facial pain.  No earaches.  No loss of taste or smell.  Her children have been ill and they tested negative for Covid.  Denies hemoptysis.  No wheezing.  Chest pain      Review of Systems   Constitutional: Positive for fatigue.   Eyes: Negative for photophobia.   Respiratory: Positive for cough and shortness of breath.    Cardiovascular: Negative.    Gastrointestinal: Negative.    Genitourinary: Negative.    Neurological: Negative for weakness and headache.   Hematological: Negative.    Psychiatric/Behavioral: Negative.        Vitals:    12/16/21 1613   BP: 136/90   Pulse: 117   SpO2: 97%   Weight: (!) 136 kg (300 lb 3.2 oz)   Height: 165.1 cm (65\")     Body mass index is 49.96 kg/m².    Physical Exam  Vitals and nursing note reviewed.   Constitutional:       General: She is not in acute distress.     Appearance: She is well-developed. She is obese. She is not diaphoretic.   HENT:      Head: Normocephalic and atraumatic.      Right Ear: External ear normal.      Left Ear: External ear normal.      Mouth/Throat:      Pharynx: No oropharyngeal exudate.   Eyes:      General: No scleral icterus.        Right eye: No discharge.      Conjunctiva/sclera: " Conjunctivae normal.   Neck:      Thyroid: No thyromegaly.      Vascular: No JVD.      Trachea: No tracheal deviation.   Cardiovascular:      Rate and Rhythm: Normal rate and regular rhythm.      Heart sounds: Normal heart sounds.      Comments: PMI nondisplaced  Pulmonary:      Effort: Pulmonary effort is normal. No respiratory distress.      Breath sounds: Rhonchi present. No wheezing or rales.   Abdominal:      General: Bowel sounds are normal.      Palpations: Abdomen is soft.      Tenderness: There is no abdominal tenderness. There is no guarding or rebound.   Musculoskeletal:      Cervical back: Normal range of motion and neck supple.      Comments: Normal gait   Lymphadenopathy:      Cervical: No cervical adenopathy.   Skin:     General: Skin is warm and dry.      Capillary Refill: Capillary refill takes less than 2 seconds.      Coloration: Skin is not pale.      Findings: No rash.   Neurological:      Mental Status: She is alert and oriented to person, place, and time.      Motor: No abnormal muscle tone.      Coordination: Coordination normal.   Psychiatric:         Judgment: Judgment normal.         Procedures    Results Review:    None    Assessment/Plan:    Problem List Items Addressed This Visit     None      Visit Diagnoses     Cough    -  Primary    Relevant Orders    COVID-19,[facility] ROUTINE, NP/OP SWAB IN TRANSPORT MEDIA OR ESWAB 72 HR TAT - Swab, Nasopharynx    POCT Influenza A/B      This is from likely bronchitis viral or bacterial.  I placed her on Z-Andres, Mucinex D.  Influenza swab and Covid swab negative.  Off work until Covid negative.  Erick Tee for cough suppression    Plan of care reviewed with patient at the conclusion of today's visit. Education was provided regarding diagnosis and management.  Patient verbalizes understanding of and agreement with management plan.    No follow-ups on file.    Vinicio Jara MD      Please note than portions of this note were completed wt a  Voice Recognition Program

## 2021-12-18 LAB
LABCORP SARS-COV-2, NAA 2 DAY TAT: NORMAL
SARS-COV-2 RNA RESP QL NAA+PROBE: NOT DETECTED

## 2022-03-03 ENCOUNTER — TELEPHONE (OUTPATIENT)
Dept: FAMILY MEDICINE CLINIC | Facility: CLINIC | Age: 32
End: 2022-03-03

## 2022-03-03 NOTE — TELEPHONE ENCOUNTER
Caller: Sasha tSewart    Relationship to patient: Self    Best call back number: [phone]    Chief complaint:     Type of visit: IN OFFICE PROCEDURE/ PAP WITHOUT PHYSICAL    Requested date: 03/09/22 AFTERNOON    If rescheduling, when is the original appointment:     Additional notes:

## 2022-03-03 NOTE — TELEPHONE ENCOUNTER
Called patient back regarding Pap-Smear scheduling, advised to call  ob/gyn in order for insurance to cover it as physical was done in the office on 9/14/2021 without Pap. Patient will call to schedule, no further questions.

## 2022-04-28 ENCOUNTER — LAB (OUTPATIENT)
Dept: LAB | Facility: HOSPITAL | Age: 32
End: 2022-04-28

## 2022-04-28 ENCOUNTER — OFFICE VISIT (OUTPATIENT)
Dept: FAMILY MEDICINE CLINIC | Facility: CLINIC | Age: 32
End: 2022-04-28

## 2022-04-28 VITALS
SYSTOLIC BLOOD PRESSURE: 122 MMHG | HEIGHT: 65 IN | WEIGHT: 293 LBS | BODY MASS INDEX: 48.82 KG/M2 | TEMPERATURE: 97.1 F | RESPIRATION RATE: 16 BRPM | DIASTOLIC BLOOD PRESSURE: 80 MMHG | OXYGEN SATURATION: 99 % | HEART RATE: 108 BPM

## 2022-04-28 DIAGNOSIS — R11.0 NAUSEA: ICD-10-CM

## 2022-04-28 DIAGNOSIS — R10.11 RUQ ABDOMINAL PAIN: ICD-10-CM

## 2022-04-28 DIAGNOSIS — K21.9 GASTROESOPHAGEAL REFLUX DISEASE, UNSPECIFIED WHETHER ESOPHAGITIS PRESENT: ICD-10-CM

## 2022-04-28 DIAGNOSIS — R10.11 RUQ ABDOMINAL PAIN: Primary | ICD-10-CM

## 2022-04-28 DIAGNOSIS — F33.41 RECURRENT MAJOR DEPRESSIVE DISORDER, IN PARTIAL REMISSION: ICD-10-CM

## 2022-04-28 LAB
ALBUMIN SERPL-MCNC: 3.8 G/DL (ref 3.5–5.2)
ALBUMIN/GLOB SERPL: 1 G/DL
ALP SERPL-CCNC: 102 U/L (ref 39–117)
ALT SERPL W P-5'-P-CCNC: 48 U/L (ref 1–33)
ANION GAP SERPL CALCULATED.3IONS-SCNC: 12 MMOL/L (ref 5–15)
AST SERPL-CCNC: 44 U/L (ref 1–32)
BASOPHILS # BLD AUTO: 0.07 10*3/MM3 (ref 0–0.2)
BASOPHILS NFR BLD AUTO: 1 % (ref 0–1.5)
BILIRUB SERPL-MCNC: 0.3 MG/DL (ref 0–1.2)
BUN SERPL-MCNC: 6 MG/DL (ref 6–20)
BUN/CREAT SERPL: 8.3 (ref 7–25)
CALCIUM SPEC-SCNC: 9 MG/DL (ref 8.6–10.5)
CHLORIDE SERPL-SCNC: 103 MMOL/L (ref 98–107)
CO2 SERPL-SCNC: 23 MMOL/L (ref 22–29)
CREAT SERPL-MCNC: 0.72 MG/DL (ref 0.57–1)
DEPRECATED RDW RBC AUTO: 49.2 FL (ref 37–54)
EGFRCR SERPLBLD CKD-EPI 2021: 114.8 ML/MIN/1.73
EOSINOPHIL # BLD AUTO: 0.17 10*3/MM3 (ref 0–0.4)
EOSINOPHIL NFR BLD AUTO: 2.4 % (ref 0.3–6.2)
ERYTHROCYTE [DISTWIDTH] IN BLOOD BY AUTOMATED COUNT: 16.3 % (ref 12.3–15.4)
GLOBULIN UR ELPH-MCNC: 3.9 GM/DL
GLUCOSE SERPL-MCNC: 90 MG/DL (ref 65–99)
HCT VFR BLD AUTO: 35.5 % (ref 34–46.6)
HGB BLD-MCNC: 11.1 G/DL (ref 12–15.9)
IMM GRANULOCYTES # BLD AUTO: 0.03 10*3/MM3 (ref 0–0.05)
IMM GRANULOCYTES NFR BLD AUTO: 0.4 % (ref 0–0.5)
LIPASE SERPL-CCNC: 14 U/L (ref 13–60)
LYMPHOCYTES # BLD AUTO: 1.2 10*3/MM3 (ref 0.7–3.1)
LYMPHOCYTES NFR BLD AUTO: 17 % (ref 19.6–45.3)
MCH RBC QN AUTO: 26.1 PG (ref 26.6–33)
MCHC RBC AUTO-ENTMCNC: 31.3 G/DL (ref 31.5–35.7)
MCV RBC AUTO: 83.5 FL (ref 79–97)
MONOCYTES # BLD AUTO: 0.4 10*3/MM3 (ref 0.1–0.9)
MONOCYTES NFR BLD AUTO: 5.7 % (ref 5–12)
NEUTROPHILS NFR BLD AUTO: 5.18 10*3/MM3 (ref 1.7–7)
NEUTROPHILS NFR BLD AUTO: 73.5 % (ref 42.7–76)
NRBC BLD AUTO-RTO: 0 /100 WBC (ref 0–0.2)
PLATELET # BLD AUTO: 416 10*3/MM3 (ref 140–450)
PMV BLD AUTO: 11.4 FL (ref 6–12)
POTASSIUM SERPL-SCNC: 4.1 MMOL/L (ref 3.5–5.2)
PROT SERPL-MCNC: 7.7 G/DL (ref 6–8.5)
RBC # BLD AUTO: 4.25 10*6/MM3 (ref 3.77–5.28)
SODIUM SERPL-SCNC: 138 MMOL/L (ref 136–145)
WBC NRBC COR # BLD: 7.05 10*3/MM3 (ref 3.4–10.8)

## 2022-04-28 PROCEDURE — 80053 COMPREHEN METABOLIC PANEL: CPT

## 2022-04-28 PROCEDURE — 83690 ASSAY OF LIPASE: CPT

## 2022-04-28 PROCEDURE — 99214 OFFICE O/P EST MOD 30 MIN: CPT | Performed by: PHYSICIAN ASSISTANT

## 2022-04-28 PROCEDURE — 85025 COMPLETE CBC W/AUTO DIFF WBC: CPT

## 2022-04-28 RX ORDER — FAMOTIDINE 20 MG/1
20 TABLET, FILM COATED ORAL 2 TIMES DAILY
Qty: 60 TABLET | Refills: 1 | Status: SHIPPED | OUTPATIENT
Start: 2022-04-28 | End: 2022-10-10 | Stop reason: SDUPTHER

## 2022-04-28 RX ORDER — BUPROPION HYDROCHLORIDE 150 MG/1
150 TABLET ORAL DAILY
Qty: 30 TABLET | Refills: 1 | Status: SHIPPED | OUTPATIENT
Start: 2022-04-28 | End: 2022-09-23

## 2022-04-28 RX ORDER — ONDANSETRON 4 MG/1
4 TABLET, FILM COATED ORAL EVERY 8 HOURS PRN
Qty: 21 TABLET | Refills: 0 | Status: SHIPPED | OUTPATIENT
Start: 2022-04-28 | End: 2022-10-10 | Stop reason: SDUPTHER

## 2022-04-28 NOTE — PROGRESS NOTES
"    Chief Complaint   Patient presents with   • Nausea     Every time she eats   • abdominal discomfort     X 1 month       HPI     Sasha Stewart is a pleasant 31 y.o. female who presents for evaluation of \"chief complaint.\"   Patient c/o nausea when she eats, particularly with greasy foods for the past 1 month. Sometimes the nausea is instantly after eating. Improved with zofran. She has also started to have heartburn 3-4 times which she has never had in the past. She notices RUQ \"sensitivity\" more when lying down in bed. Concerned it may be her gallbladder. Denies vomiting, nausea, fever, chills, tarry stools. Some looser stools x 2 weeks. No regular NSAID use. She admits to about 4 shots of liquor on twice per week.     Past Medical History:   Diagnosis Date   • Anemia    • Asthma    • Depression    • Fibroid    • PID (pelvic inflammatory disease)        Past Surgical History:   Procedure Laterality Date   •  SECTION      2013   •  SECTION WITH TUBAL Bilateral 2016    Procedure:  SECTION REPEAT WITH TUBAL;  Surgeon: Chrissy Hampton MD;  Location: [facility] LABOR DELIVERY;  Service:    • GASTRIC SLEEVE LAPAROSCOPIC     • TUBAL ABDOMINAL LIGATION     • WISDOM TOOTH EXTRACTION         Family History   Problem Relation Age of Onset   • Diabetes Mother    • Hypertension Mother    • Arthritis Mother    • Obesity Mother    • Hypertension Father    • Coronary artery disease Paternal Grandfather    • Stroke Paternal Grandfather    • Hypertension Maternal Grandmother    • Arthritis Sister    • Obesity Sister    • Migraines Sister    • Obesity Brother    • Arthritis Maternal Grandfather        Social History     Socioeconomic History   • Marital status:    Tobacco Use   • Smoking status: Never Smoker   • Smokeless tobacco: Never Used   Vaping Use   • Vaping Use: Never used   Substance and Sexual Activity   • Alcohol use: No   • Drug use: No   • Sexual activity: Yes     Partners: Male "       No Known Allergies    ROS    Review of Systems   Constitutional: Negative for chills and fever.   Gastrointestinal: Positive for abdominal pain, diarrhea and nausea. Negative for blood in stool, constipation and vomiting.       Vitals:    04/28/22 1106   BP: 122/80   Pulse: 108   Resp: 16   Temp: 97.1 °F (36.2 °C)   SpO2: 99%     Body mass index is 49.09 kg/m².      Current Outpatient Medications:   •  amitriptyline (ELAVIL) 25 MG tablet, Take 1.5 tablets by mouth Every Night., Disp: 135 tablet, Rfl: 0  •  buPROPion XL (Wellbutrin XL) 150 MG 24 hr tablet, Take 1 tablet by mouth Daily., Disp: 30 tablet, Rfl: 1  •  fluticasone (Flonase) 50 MCG/ACT nasal spray, 2 sprays into the nostril(s) as directed by provider Daily. (Patient taking differently: 2 sprays into the nostril(s) as directed by provider As Needed.), Disp: 18.2 mL, Rfl: 2  •  hydrOXYzine (ATARAX) 25 MG tablet, Take 0.5-1 tablets by mouth 3 (Three) Times a Day As Needed (anxiety and sleep)., Disp: 30 tablet, Rfl: 2  •  pseudoephedrine-guaifenesin (MUCINEX D)  MG per 12 hr tablet, Take 1 tablet by mouth Every 12 (Twelve) Hours., Disp: 20 tablet, Rfl: 0  •  benzonatate (Tessalon Perles) 100 MG capsule, Take 1 capsule by mouth 3 (Three) Times a Day As Needed for Cough., Disp: 30 capsule, Rfl: 0  •  famotidine (Pepcid) 20 MG tablet, Take 1 tablet by mouth 2 (Two) Times a Day., Disp: 60 tablet, Rfl: 1  •  ondansetron (Zofran) 4 MG tablet, Take 1 tablet by mouth Every 8 (Eight) Hours As Needed for Nausea or Vomiting., Disp: 21 tablet, Rfl: 0    PE    Physical Exam  Vitals reviewed.   Constitutional:       General: She is not in acute distress.     Appearance: She is well-developed. She is obese.   HENT:      Head: Normocephalic and atraumatic.   Eyes:      Conjunctiva/sclera: Conjunctivae normal.   Cardiovascular:      Rate and Rhythm: Normal rate and regular rhythm.      Heart sounds: Normal heart sounds. No murmur heard.  Pulmonary:      Effort:  Pulmonary effort is normal.      Breath sounds: Normal breath sounds.   Abdominal:      General: Bowel sounds are normal.      Palpations: Abdomen is soft.      Tenderness: There is abdominal tenderness in the right upper quadrant. There is no rebound.   Musculoskeletal:      Cervical back: Normal range of motion.   Skin:     General: Skin is warm and dry.   Neurological:      Mental Status: She is alert.      Gait: Gait normal.   Psychiatric:         Speech: Speech normal.         Behavior: Behavior normal.          A/P    Problem List Items Addressed This Visit        Mental Health    Depression    Relevant Medications    hydrOXYzine (ATARAX) 25 MG tablet    amitriptyline (ELAVIL) 25 MG tablet    buPROPion XL (Wellbutrin XL) 150 MG 24 hr tablet      Other Visit Diagnoses     RUQ abdominal pain    -  Primary  -Ddx includes GB disorder, gastritis, pancreatitis, choledocholithiasis, colitis, among others  -Order labs and GB ultrasound   -Treat with pepcid for GERD and zofran for nausea prn  -If symptoms improve on Pepcid, we discussed gradually tapering this medication  -Advised patient seek medical attention for severe pain, vomiting, or fever    Relevant Orders    US Gallbladder    CBC & Differential    Comprehensive Metabolic Panel    Lipase    Nausea        Gastroesophageal reflux disease, unspecified whether esophagitis present        Relevant Medications    famotidine (Pepcid) 20 MG tablet          Plan of care was reviewed with patient at the conclusion of today's visit. Education was provided regarding diagnoses, management, prescribed or recommended OTC products, and the importance of compliance with follow-up appointments. The patient was counseled regarding the risks, benefits, and possible side-effects of treatment. I advised the patient to keep me informed of any acute changes in their status including new, worsening, or persistent symptoms. Patient expresses understanding and agreement with the  management plan.         MARILOU Howell

## 2022-05-12 ENCOUNTER — HOSPITAL ENCOUNTER (OUTPATIENT)
Dept: ULTRASOUND IMAGING | Facility: HOSPITAL | Age: 32
Discharge: HOME | End: 2022-05-12
Admitting: PHYSICIAN ASSISTANT

## 2022-05-12 DIAGNOSIS — R10.11 RUQ ABDOMINAL PAIN: ICD-10-CM

## 2022-05-12 PROCEDURE — 76705 ECHO EXAM OF ABDOMEN: CPT

## 2022-05-18 DIAGNOSIS — R11.0 NAUSEA: ICD-10-CM

## 2022-05-18 DIAGNOSIS — R10.11 RUQ ABDOMINAL PAIN: Primary | ICD-10-CM

## 2022-05-20 ENCOUNTER — APPOINTMENT (OUTPATIENT)
Dept: ULTRASOUND IMAGING | Facility: HOSPITAL | Age: 32
End: 2022-05-20

## 2022-06-16 ENCOUNTER — HOSPITAL ENCOUNTER (OUTPATIENT)
Dept: NUCLEAR MEDICINE | Facility: HOSPITAL | Age: 32
Discharge: HOME | End: 2022-06-16

## 2022-06-16 DIAGNOSIS — R10.11 RUQ ABDOMINAL PAIN: ICD-10-CM

## 2022-06-16 DIAGNOSIS — R11.0 NAUSEA: ICD-10-CM

## 2022-06-16 PROCEDURE — A9537 TC99M MEBROFENIN: HCPCS | Performed by: PHYSICIAN ASSISTANT

## 2022-06-16 PROCEDURE — 78227 HEPATOBIL SYST IMAGE W/DRUG: CPT

## 2022-06-16 PROCEDURE — 0 TECHNETIUM TC 99M MEBROFENIN KIT: Performed by: PHYSICIAN ASSISTANT

## 2022-06-16 RX ORDER — KIT FOR THE PREPARATION OF TECHNETIUM TC 99M MEBROFENIN 45 MG/10ML
1 INJECTION, POWDER, LYOPHILIZED, FOR SOLUTION INTRAVENOUS
Status: COMPLETED | OUTPATIENT
Start: 2022-06-16 | End: 2022-06-16

## 2022-06-16 RX ADMIN — MEBROFENIN 1 DOSE: 45 INJECTION, POWDER, LYOPHILIZED, FOR SOLUTION INTRAVENOUS at 11:25

## 2022-07-29 RX ORDER — ONDANSETRON 4 MG/1
4 TABLET, FILM COATED ORAL EVERY 8 HOURS PRN
Qty: 21 TABLET | Refills: 0 | OUTPATIENT
Start: 2022-07-29

## 2022-07-29 NOTE — TELEPHONE ENCOUNTER
Caller: Sasha Stewart    Relationship: Self    Best call back number: [phone]    Requested Prescriptions:   Requested Prescriptions     Pending Prescriptions Disp Refills   • ondansetron (Zofran) 4 MG tablet 21 tablet 0     Sig: Take 1 tablet by mouth Every 8 (Eight) Hours As Needed for Nausea or Vomiting.        Pharmacy where request should be sent: [facility] [address] [place] [address] [phone] PH - [phone] FX     Additional details provided by patient: PATIENT IS OUT AND NEEDS REFILLED ASAP     Does the patient have less than a 3 day supply:  [x] Yes  [] No    Su Goode Rep   07/29/22 10:18 EDT

## 2022-07-29 NOTE — TELEPHONE ENCOUNTER
Rx Refill Note  Requested Prescriptions     Pending Prescriptions Disp Refills   • ondansetron (Zofran) 4 MG tablet 21 tablet 0     Sig: Take 1 tablet by mouth Every 8 (Eight) Hours As Needed for Nausea or Vomiting.      Last office visit with prescribing clinician: 4/28/2022      Next office visit with prescribing clinician: Visit date not found            Phil Ball MA  07/29/22, 10:23 EDT       Patient notified. Advised patient that she was to Return in about 1 month (around 5/28/2022). Asked patient if she wanted me to set up an appointment. Patient stated that she will call us back when she is able to make it in.

## 2022-09-23 ENCOUNTER — HOSPITAL ENCOUNTER (OUTPATIENT)
Dept: GENERAL RADIOLOGY | Facility: HOSPITAL | Age: 32
Discharge: HOME | End: 2022-09-23
Admitting: FAMILY MEDICINE

## 2022-09-23 ENCOUNTER — OFFICE VISIT (OUTPATIENT)
Dept: FAMILY MEDICINE CLINIC | Facility: CLINIC | Age: 32
End: 2022-09-23

## 2022-09-23 VITALS
SYSTOLIC BLOOD PRESSURE: 120 MMHG | WEIGHT: 287.4 LBS | TEMPERATURE: 97.5 F | HEART RATE: 112 BPM | DIASTOLIC BLOOD PRESSURE: 82 MMHG | OXYGEN SATURATION: 96 % | BODY MASS INDEX: 47.83 KG/M2

## 2022-09-23 DIAGNOSIS — F33.41 RECURRENT MAJOR DEPRESSIVE DISORDER, IN PARTIAL REMISSION: ICD-10-CM

## 2022-09-23 DIAGNOSIS — M22.2X1 PATELLOFEMORAL PAIN SYNDROME OF RIGHT KNEE: Primary | ICD-10-CM

## 2022-09-23 DIAGNOSIS — M22.2X1 PATELLOFEMORAL PAIN SYNDROME OF RIGHT KNEE: ICD-10-CM

## 2022-09-23 PROCEDURE — 73562 X-RAY EXAM OF KNEE 3: CPT

## 2022-09-23 PROCEDURE — 99214 OFFICE O/P EST MOD 30 MIN: CPT | Performed by: FAMILY MEDICINE

## 2022-09-23 RX ORDER — BUPROPION HYDROCHLORIDE 300 MG/1
300 TABLET ORAL DAILY
Qty: 90 TABLET | Refills: 1 | Status: SHIPPED | OUTPATIENT
Start: 2022-09-23 | End: 2022-09-29

## 2022-09-23 RX ORDER — FAMOTIDINE 20 MG/1
20 TABLET, FILM COATED ORAL 2 TIMES DAILY
Qty: 60 TABLET | Refills: 1 | Status: CANCELLED | OUTPATIENT
Start: 2022-09-23

## 2022-09-23 NOTE — PROGRESS NOTES
"Established Patient Office Visit      Patient Name: Sasha Stewart  : 1990   MRN: [id number]   Care Team: Patient Care Team:  Pion Willis DO as PCP - General (Family Medicine)    Chief Complaint:    Chief Complaint   Patient presents with   • Knee Pain     Pt co left knee pain (unknown reason) hurting for about 1 week.   • Depression       History of Present Illness: Sasha Stewart is a 32 y.o. female who is here today for chief complaint.    ROMULO Eddy presents today for an acute same day visit for \"knee pain, and depression.\"    She reports she injured her knees last week, and is unsure how she injured it. She denies any injury to her knees. She reports the pain started on the lateral aspect of her knee, and radiates across the anterior and posterior aspects of her knee. She denies any problems with her knee in the past. She reports her knee has been popping a lot on the lateral side of her knee, and it is painful when it pops. She denies any increased activity or change in activity or walking tours. She denies being on her feet more than usual. She reports any kind of movement, such as getting on her knees to wash their hair, and getting in and out of bed, and putting her leg up to putting it down, and walking up stairs is painful, especially in the posterior aspect of her knee. She reports walking on level ground is painful, but not as bad as the stairs. She has been taking ibuprofen, Aleve, and Tylenol for the pain. She reports the pain came on suddenly, and she is unsure if she popped it a wrong way. She reports the pain was during the day, and she thinks she got in the bed wrong. She reports it makes a certain noise now, and she can feel it. She reports it is uncomfortable when it grinds.    She reports depression has been worsening over the last couple of weeks. She reports the last time she was here, she was put on Wellbutrin, and it has helped her anxiety, but " for some reason in the past 2 weeks, her depression has worsened. She denies any life events that have made her depression worse.    The following portions of the patient's history were reviewed and updated as appropriate: allergies, current medications, past family history, past medical history, past social history, past surgical history and problem list.    Subjective      Review of Systems:   Review of Systems - See HPI    Past Medical History:   Past Medical History:   Diagnosis Date   • Anemia    • Asthma    • Depression    • Fibroid    • PID (pelvic inflammatory disease)        Past Surgical History:   Past Surgical History:   Procedure Laterality Date   •  SECTION      2013   •  SECTION WITH TUBAL Bilateral 2016    Procedure:  SECTION REPEAT WITH TUBAL;  Surgeon: Chrissy Hampton MD;  Location: [facility] LABOR DELIVERY;  Service:    • GASTRIC SLEEVE LAPAROSCOPIC     • TUBAL ABDOMINAL LIGATION     • WISDOM TOOTH EXTRACTION         Family History:   Family History   Problem Relation Age of Onset   • Diabetes Mother    • Hypertension Mother    • Arthritis Mother    • Obesity Mother    • Hypertension Father    • Coronary artery disease Paternal Grandfather    • Stroke Paternal Grandfather    • Hypertension Maternal Grandmother    • Arthritis Sister    • Obesity Sister    • Migraines Sister    • Obesity Brother    • Arthritis Maternal Grandfather        Social History:   Social History     Socioeconomic History   • Marital status:    Tobacco Use   • Smoking status: Never Smoker   • Smokeless tobacco: Never Used   Vaping Use   • Vaping Use: Never used   Substance and Sexual Activity   • Alcohol use: No   • Drug use: No   • Sexual activity: Yes     Partners: Male       Tobacco History:   Social History     Tobacco Use   Smoking Status Never Smoker   Smokeless Tobacco Never Used       Medications:     Current Outpatient Medications:   •  amitriptyline (ELAVIL) 25 MG tablet, Take  1.5 tablets by mouth Every Night., Disp: 135 tablet, Rfl: 0  •  buPROPion XL (Wellbutrin XL) 300 MG 24 hr tablet, Take 1 tablet by mouth Daily., Disp: 90 tablet, Rfl: 1  •  famotidine (Pepcid) 20 MG tablet, Take 1 tablet by mouth 2 (Two) Times a Day., Disp: 60 tablet, Rfl: 1  •  fluticasone (Flonase) 50 MCG/ACT nasal spray, 2 sprays into the nostril(s) as directed by provider Daily. (Patient taking differently: 2 sprays into the nostril(s) as directed by provider As Needed.), Disp: 18.2 mL, Rfl: 2  •  ondansetron (Zofran) 4 MG tablet, Take 1 tablet by mouth Every 8 (Eight) Hours As Needed for Nausea or Vomiting., Disp: 21 tablet, Rfl: 0  •  Elastic Bandages & Supports (Knee Wrap/Patellar Stablizing) misc, Use on left knee daily, Disp: 1 each, Rfl: 0    Allergies:   No Known Allergies    Objective   Objective     Physical Exam:  Vital Signs:   Vitals:    09/23/22 1339   BP: 120/82   BP Location: Left arm   Patient Position: Sitting   Cuff Size: Large Adult   Pulse: 112   Temp: 97.5 °F (36.4 °C)   TempSrc: Infrared   SpO2: 96%   Weight: 130 kg (287 lb 6.4 oz)     Body mass index is 47.83 kg/m².     Physical Exam  Const: NAD, A & Ox4, Pleasant, Cooperative  Eyes: EOMI, no conjunctivitis  ENT: No nasal discharge present, neck supple  Cardiac: Regular rate and rhythm, no cyanosis  Resp: Respiratory rate within normal limits, no increased work of breathing, no audible wheezing or retractions noted  GI: No distention or ascites  MSK: Motor and sensation grossly intact in bilateral upper extremities  Bilateral knees: There is a lot of crepitus, but there is some medial traction on that patella that crepitus does resolve on exam.  Neurologic: CN II-XII grossly intact  Psych: Appropriate mood and behavior.  Skin: Warm, dry  Procedures/Radiology     Procedures  XR Knee 3 View Left    Result Date: 9/23/2022  Mild irregularity along the medial facet of the patella and slight lateral displacement. Consider sequela of recent  dislocation. Underlying subchondral lesion, fracture not excluded.  This report was finalized on 9/23/2022 2:54 PM by Kang Neil.         Assessment & Plan   Assessment / Plan      Assessment/Plan:   Problems Addressed This Visit  Diagnoses and all orders for this visit:    1. Patellofemoral pain syndrome of right knee (Primary)  -     Ambulatory Referral to Physical Therapy Evaluate and treat  -     XR Knee 3 View Left; Future  -     Elastic Bandages & Supports (Knee Wrap/Patellar Stablizing) misc; Use on left knee daily  Dispense: 1 each; Refill: 0    2. Recurrent major depressive disorder, in partial remission (HCC)  -     buPROPion XL (Wellbutrin XL) 300 MG 24 hr tablet; Take 1 tablet by mouth Daily.  Dispense: 90 tablet; Refill: 1      Problem List Items Addressed This Visit        Mental Health    Depression    Relevant Medications    buPROPion XL (Wellbutrin XL) 300 MG 24 hr tablet      Other Visit Diagnoses     Patellofemoral pain syndrome of right knee    -  Primary    Relevant Medications    Elastic Bandages & Supports (Knee Wrap/Patellar Stablizing) misc    Other Relevant Orders    Ambulatory Referral to Physical Therapy Evaluate and treat    XR Knee 3 View Left (Completed)          1. Right knee pain.  Her location of pain and exam today suggest a possible previous dislocation, however her clinical story is not consistent with that whatsoever.  - I will order an x-ray of her right knee today.  - I will refer her to physical therapy and I recommended a patellar stabilizing brace.  - She can take ibuprofen a couple of times a day and ice.    2. Depression.  - I will increase her Wellbutrin to 300 mg      There are no Patient Instructions on file for this visit.    Follow Up:   Return in about 6 weeks (around 11/4/2022) for (with PHQ9);.    DO MANJINDER Benjamin RD  [facility]  [address]  [facility] [address]  Fax  [phone]  Phone [phone]       Transcribed from ambient dictation for Pino Willis DO by Rochelle Cortez.  09/23/22   14:26 EDT    Patient verbalized consent to the visit recording.  I have personally performed the services described in this document as transcribed by the above individual, and it is both accurate and complete.  Pnio Willis DO  9/26/2022  10:05 EDT

## 2022-09-29 ENCOUNTER — TELEMEDICINE (OUTPATIENT)
Dept: FAMILY MEDICINE CLINIC | Facility: CLINIC | Age: 32
End: 2022-09-29

## 2022-09-29 DIAGNOSIS — M23.8X2 CHONDRAL DEFECT OF LEFT PATELLA: ICD-10-CM

## 2022-09-29 DIAGNOSIS — F33.41 RECURRENT MAJOR DEPRESSIVE DISORDER, IN PARTIAL REMISSION: Primary | ICD-10-CM

## 2022-09-29 PROCEDURE — 99213 OFFICE O/P EST LOW 20 MIN: CPT | Performed by: FAMILY MEDICINE

## 2022-09-29 RX ORDER — BUSPIRONE HYDROCHLORIDE 10 MG/1
10 TABLET ORAL DAILY
Qty: 30 TABLET | Refills: 2 | Status: SHIPPED | OUTPATIENT
Start: 2022-09-29 | End: 2022-11-04

## 2022-09-29 RX ORDER — BUPROPION HYDROCHLORIDE 150 MG/1
150 TABLET, EXTENDED RELEASE ORAL 2 TIMES DAILY
Qty: 60 TABLET | Refills: 11 | Status: SHIPPED | OUTPATIENT
Start: 2022-09-29 | End: 2022-11-04 | Stop reason: SDUPTHER

## 2022-09-29 NOTE — PROGRESS NOTES
Subjective   Sasha Stewart is a 32 y.o. female.     Chief Complaint   Patient presents with   • Depression       History of Present Illness     Sasha Stewart presents today for   Chief Complaint   Patient presents with   • Depression     Depression getting worse, had to leave work yesterday. 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 Seeing the casing from Wellbutrin in stool. Had gastric sleeve.    You have chosen to receive care through a telehealth visit.  Do you consent to use a video/audio connection for your medical care today? Yes    Patient location: [place], [place]  Provider location: [place], [place]    The following portions of the patient's history were reviewed and updated as appropriate: allergies, current medications, past family history, past medical history, past social history, past surgical history and problem list.    Active Ambulatory Problems     Diagnosis Date Noted   • Iron deficiency anemia 2021   • Vitamin D deficiency 2021   • Depression      Resolved Ambulatory Problems     Diagnosis Date Noted   • Dichorionic diamniotic twin gestation 2016   • Uterine size date discrepancy 2016   • Previous  section complicating pregnancy 2016   • Dichorionic diamniotic twin pregnancy in third trimester 2016   • Right leg pain 2018   • Strep pharyngitis 2019   • Pharyngitis 2019   • Strep throat exposure 2019     Past Medical History:   Diagnosis Date   • Anemia    • Asthma    • Fibroid    • PID (pelvic inflammatory disease)      Past Surgical History:   Procedure Laterality Date   •  SECTION      2013   •  SECTION WITH TUBAL Bilateral 2016    Procedure:  SECTION REPEAT WITH TUBAL;  Surgeon: Chrissy Hampton MD;  Location: [facility] LABOR DELIVERY;  Service:    • GASTRIC SLEEVE LAPAROSCOPIC     • TUBAL ABDOMINAL LIGATION     • WISDOM TOOTH EXTRACTION        Family History   Problem Relation Age of Onset   • Diabetes Mother    • Hypertension Mother    • Arthritis Mother    • Obesity Mother    • Hypertension Father    • Coronary artery disease Paternal Grandfather    • Stroke Paternal Grandfather    • Hypertension Maternal Grandmother    • Arthritis Sister    • Obesity Sister    • Migraines Sister    • Obesity Brother    • Arthritis Maternal Grandfather      Social History     Socioeconomic History   • Marital status:    Tobacco Use   • Smoking status: Never Smoker   • Smokeless tobacco: Never Used   Vaping Use   • Vaping Use: Never used   Substance and Sexual Activity   • Alcohol use: No   • Drug use: No   • Sexual activity: Yes     Partners: Male       Review of Systems  Review of Systems -  General ROS: negative for - chills, fever or night sweats  Cardiovascular ROS: no chest pain or dyspnea on exertion  Gastrointestinal ROS: no abdominal pain, change in bowel habits, or black or bloody stools  Genito-Urinary ROS: no dysuria, trouble voiding, or hematuria    Objective   not currently breastfeeding.  Vitals obtained from patient if available  Physical Exam  Const: Non-toxic appearing, NAD, A&Ox4, Pleasant, Cooperative  Eyes: EOMI, no conjunctivitis  ENT: No copious nasal drainage noted  Cardiac: Regular rate by pulse  Resp: Respiratory rate observed to be within normal limits, no increased work of breathing observed, no audible wheezing or cough noted  Psych: Appropriate mood and behavior.  Procedures  Assessment & Plan   Problem List Items Addressed This Visit        Mental Health    Depression - Primary    Relevant Medications    busPIRone (BUSPAR) 10 MG tablet    buPROPion SR (Wellbutrin SR) 150 MG 12 hr tablet    Other Relevant Orders    Ambulatory Referral to Behavioral Health      Other Visit Diagnoses     Chondral defect of left patella        Relevant Orders    MRI Knee Left Without Contrast        Changed wellbutrin over to BID from XL due to  gastric sleeve  Add buspar daily    For her knee, her exam last week was consistent with OCD lesion and patellar subluxation history but clinical history and injury not consistent. X-ray also suggestive of patellar dislocation, recommend MRI.    See patient diagnoses and orders along with patient instructions for assessment, plan, and changes to care for patient.    This visit was conducted via telemedicine with live video and audio provided through Video Options: MyChart/Zoom at the point of care.    There are no Patient Instructions on file for this visit.    Return in about 6 weeks (around 11/10/2022) for depression.    Ambulatory progress note signed and attested to by Pino Willis D.O.

## 2022-10-10 ENCOUNTER — HOSPITAL ENCOUNTER (OUTPATIENT)
Dept: MRI IMAGING | Facility: HOSPITAL | Age: 32
Discharge: HOME | End: 2022-10-10
Admitting: FAMILY MEDICINE

## 2022-10-10 DIAGNOSIS — M23.8X2 CHONDRAL DEFECT OF LEFT PATELLA: ICD-10-CM

## 2022-10-10 PROCEDURE — 73721 MRI JNT OF LWR EXTRE W/O DYE: CPT

## 2022-10-10 RX ORDER — FAMOTIDINE 20 MG/1
20 TABLET, FILM COATED ORAL 2 TIMES DAILY
Qty: 60 TABLET | Refills: 1 | Status: SHIPPED | OUTPATIENT
Start: 2022-10-10

## 2022-10-10 RX ORDER — ONDANSETRON 4 MG/1
4 TABLET, FILM COATED ORAL EVERY 8 HOURS PRN
Qty: 21 TABLET | Refills: 0 | Status: SHIPPED | OUTPATIENT
Start: 2022-10-10 | End: 2023-02-13 | Stop reason: SDUPTHER

## 2022-10-10 NOTE — TELEPHONE ENCOUNTER
Rx Refill Note  Requested Prescriptions     Pending Prescriptions Disp Refills   • famotidine (Pepcid) 20 MG tablet 60 tablet 1     Sig: Take 1 tablet by mouth 2 (Two) Times a Day.   • ondansetron (Zofran) 4 MG tablet 21 tablet 0     Sig: Take 1 tablet by mouth Every 8 (Eight) Hours As Needed for Nausea or Vomiting.      Last office visit with prescribing clinician: 9/23/2022      Next office visit with prescribing clinician: 11/4/2022            Cathie Sloan MA  10/10/22, 13:28 EDT

## 2022-10-10 NOTE — TELEPHONE ENCOUNTER
Caller: Sasha Stewart    Relationship: Self    Best call back number: [phone]    Requested Prescriptions:   Requested Prescriptions     Pending Prescriptions Disp Refills   • famotidine (Pepcid) 20 MG tablet 60 tablet 1     Sig: Take 1 tablet by mouth 2 (Two) Times a Day.   • ondansetron (Zofran) 4 MG tablet 21 tablet 0     Sig: Take 1 tablet by mouth Every 8 (Eight) Hours As Needed for Nausea or Vomiting.        Pharmacy where request should be sent: [facility] [phone]     Additional details provided by patient: PATIENT IS COMPLETELY OUT OF MEDICATIONS.     Does the patient have less than a 3 day supply:  [x] Yes  [] No    Su Diaz Rep   10/10/22 13:09 EDT

## 2022-11-02 ENCOUNTER — TELEPHONE (OUTPATIENT)
Dept: URGENT CARE | Facility: CLINIC | Age: 32
End: 2022-11-02

## 2022-11-02 NOTE — TELEPHONE ENCOUNTER
Patient called & stated she went to her Pharmacy , the Albuterol and inhaler was not sent, Provider discuss to her that she get those as well.

## 2022-11-04 ENCOUNTER — OFFICE VISIT (OUTPATIENT)
Dept: FAMILY MEDICINE CLINIC | Facility: CLINIC | Age: 32
End: 2022-11-04

## 2022-11-04 VITALS
BODY MASS INDEX: 44.52 KG/M2 | OXYGEN SATURATION: 99 % | HEIGHT: 66 IN | DIASTOLIC BLOOD PRESSURE: 72 MMHG | WEIGHT: 277 LBS | SYSTOLIC BLOOD PRESSURE: 128 MMHG | RESPIRATION RATE: 16 BRPM | HEART RATE: 109 BPM

## 2022-11-04 DIAGNOSIS — M22.2X1 PATELLOFEMORAL PAIN SYNDROME OF RIGHT KNEE: ICD-10-CM

## 2022-11-04 DIAGNOSIS — M23.8X2 CHONDRAL DEFECT OF LEFT PATELLA: ICD-10-CM

## 2022-11-04 DIAGNOSIS — F33.41 RECURRENT MAJOR DEPRESSIVE DISORDER, IN PARTIAL REMISSION: ICD-10-CM

## 2022-11-04 DIAGNOSIS — J45.20 MILD INTERMITTENT ASTHMA WITHOUT COMPLICATION: Primary | ICD-10-CM

## 2022-11-04 PROCEDURE — 99213 OFFICE O/P EST LOW 20 MIN: CPT | Performed by: FAMILY MEDICINE

## 2022-11-04 RX ORDER — BUPROPION HYDROCHLORIDE 150 MG/1
150 TABLET, EXTENDED RELEASE ORAL 2 TIMES DAILY
Qty: 60 TABLET | Refills: 11 | Status: SHIPPED | OUTPATIENT
Start: 2022-11-04

## 2022-11-04 RX ORDER — ALBUTEROL SULFATE 90 UG/1
2 AEROSOL, METERED RESPIRATORY (INHALATION) EVERY 4 HOURS PRN
Qty: 8 G | Refills: 1 | Status: SHIPPED | OUTPATIENT
Start: 2022-11-04

## 2022-11-04 RX ORDER — BUSPIRONE HYDROCHLORIDE 10 MG/1
10 TABLET ORAL 2 TIMES DAILY
Qty: 60 TABLET | Refills: 11 | Status: SHIPPED | OUTPATIENT
Start: 2022-11-04

## 2022-11-04 NOTE — PROGRESS NOTES
Established Patient Office Visit      Patient Name: Sasha Stewart  : 1990   MRN: [id number]   Care Team: Patient Care Team:  Pino Willis DO as PCP - General (Family Medicine)    Chief Complaint:    Chief Complaint   Patient presents with   • Knee Pain   • Med Refill       History of Present Illness: Sasha Stewart is a 32 y.o. female who is here today for chief complaint.    HPI    The patient presents today for a follow-up on anxiety and depression. I last saw her on a telehealth visit in 2022. At that time, we changed her over from the Wellbutrin XL to Wellbutrin SR just due to her gastric sleeve, and added buspirone 10 mg daily.    The patient is doing well on her medications. She still gets anxiety occasionally, but is significantly improved. She is currently taking buspirone once daily.    She declines the influenza vaccination today.    She reports that she had an MRI of her knee performed, which was normal. She states that she got a new job, and she uses the stairs approximately 20 times per day. She states that it does help a little bit, but then she can feel it afterwards. She states that it is worse when she is going up stairs. She states that if she uses the stairs more, it helps in a way. She states that on the weekends, she does not use her knees the way she does during the week.    She thinks her asthma is back. She states that whenever she uses the stairs multiple times per day, she gets wheezing again. She states that she used to have asthma as a teenager. She states that she does not have an inhaler at home. She went to the doctor on 2022, and she had an upper respiratory infection.      The following portions of the patient's history were reviewed and updated as appropriate: allergies, current medications, past family history, past medical history, past social history, past surgical history and problem list.    Subjective      Review of Systems:    Review of Systems - See HPI    Past Medical History:   Past Medical History:   Diagnosis Date   • Anemia    • Anxiety    • Asthma    • Depression    • Fibroid    • PID (pelvic inflammatory disease)        Past Surgical History:   Past Surgical History:   Procedure Laterality Date   • BARIATRIC SURGERY     •  SECTION      2013   •  SECTION WITH TUBAL Bilateral 2016    Procedure:  SECTION REPEAT WITH TUBAL;  Surgeon: Chrissy Hampton MD;  Location: [facility] LABOR DELIVERY;  Service:    • GASTRIC SLEEVE LAPAROSCOPIC     • TUBAL ABDOMINAL LIGATION     • WISDOM TOOTH EXTRACTION         Family History:   Family History   Problem Relation Age of Onset   • Diabetes Mother    • Hypertension Mother    • Arthritis Mother    • Obesity Mother    • Hypertension Father    • Coronary artery disease Paternal Grandfather    • Stroke Paternal Grandfather    • Hypertension Maternal Grandmother    • Arthritis Sister    • Obesity Sister    • Migraines Sister    • Obesity Brother    • Arthritis Maternal Grandfather        Social History:   Social History     Socioeconomic History   • Marital status:    Tobacco Use   • Smoking status: Never     Passive exposure: Never   • Smokeless tobacco: Never   Vaping Use   • Vaping Use: Never used   Substance and Sexual Activity   • Alcohol use: No   • Drug use: No   • Sexual activity: Yes     Partners: Male     Birth control/protection: Tubal ligation       Tobacco History:   Social History     Tobacco Use   Smoking Status Never   • Passive exposure: Never   Smokeless Tobacco Never       Medications:     Current Outpatient Medications:   •  amitriptyline (ELAVIL) 25 MG tablet, Take 1.5 tablets by mouth Every Night., Disp: 135 tablet, Rfl: 0  •  brompheniramine-pseudoephedrine-DM 30-2-10 MG/5ML syrup, 1 to 2 teaspoons 4 times daily as needed cough congestion., Disp: 120 mL, Rfl: 0  •  buPROPion SR (Wellbutrin SR) 150 MG 12 hr tablet, Take 1 tablet by mouth 2  "(Two) Times a Day., Disp: 60 tablet, Rfl: 11  •  busPIRone (BUSPAR) 10 MG tablet, Take 1 tablet by mouth 2 (Two) Times a Day., Disp: 60 tablet, Rfl: 11  •  Elastic Bandages & Supports (Knee Wrap/Patellar Stablizing) misc, Use on left knee daily, Disp: 1 each, Rfl: 0  •  famotidine (Pepcid) 20 MG tablet, Take 1 tablet by mouth 2 (Two) Times a Day., Disp: 60 tablet, Rfl: 1  •  fluticasone (Flonase) 50 MCG/ACT nasal spray, 2 sprays into the nostril(s) as directed by provider Daily. (Patient taking differently: 2 sprays into the nostril(s) as directed by provider As Needed.), Disp: 18.2 mL, Rfl: 2  •  ondansetron (Zofran) 4 MG tablet, Take 1 tablet by mouth Every 8 (Eight) Hours As Needed for Nausea or Vomiting., Disp: 21 tablet, Rfl: 0  •  albuterol sulfate  (90 Base) MCG/ACT inhaler, Inhale 2 puffs Every 4 (Four) Hours As Needed for Wheezing or Shortness of Air., Disp: 8 g, Rfl: 1    Allergies:   No Known Allergies    Objective   Objective     Physical Exam:  Vital Signs:   Vitals:    11/04/22 1521   BP: 128/72   Pulse: 109   Resp: 16   SpO2: 99%   Weight: 126 kg (277 lb)   Height: 167.6 cm (66\")     Body mass index is 44.71 kg/m².     Physical Exam  Const: NAD, A & Ox4, Pleasant, Cooperative  Eyes: EOMI, no conjunctivitis  ENT: No nasal discharge present, neck supple  Cardiac: Regular rate and rhythm, no cyanosis  Resp: Respiratory rate within normal limits, no increased work of breathing, no audible wheezing or retractions noted  GI: No distention or ascites  MSK: Motor and sensation grossly intact in bilateral upper extremities  Neurologic: CN II-XII grossly intact  Psych: Appropriate mood and behavior.  Skin: Warm, dry  Procedures/Radiology     Procedures  No radiology results for the last 7 days     Assessment & Plan   Assessment / Plan      Assessment/Plan:   Problems Addressed This Visit  Diagnoses and all orders for this visit:    1. Mild intermittent asthma without complication (Primary)  -     " albuterol sulfate  (90 Base) MCG/ACT inhaler; Inhale 2 puffs Every 4 (Four) Hours As Needed for Wheezing or Shortness of Air.  Dispense: 8 g; Refill: 1    2. Recurrent major depressive disorder, in partial remission (HCC)  -     busPIRone (BUSPAR) 10 MG tablet; Take 1 tablet by mouth 2 (Two) Times a Day.  Dispense: 60 tablet; Refill: 11  -     buPROPion SR (Wellbutrin SR) 150 MG 12 hr tablet; Take 1 tablet by mouth 2 (Two) Times a Day.  Dispense: 60 tablet; Refill: 11    3. Chondral defect of left patella  -     Ambulatory Referral to Physical Therapy Evaluate and treat    4. Patellofemoral pain syndrome of right knee  -     Ambulatory Referral to Physical Therapy Evaluate and treat      Problem List Items Addressed This Visit        Mental Health    Depression    Relevant Medications    busPIRone (BUSPAR) 10 MG tablet    buPROPion SR (Wellbutrin SR) 150 MG 12 hr tablet   Other Visit Diagnoses     Mild intermittent asthma without complication    -  Primary    Relevant Medications    albuterol sulfate  (90 Base) MCG/ACT inhaler    Chondral defect of left patella        Patellofemoral pain syndrome of right knee              1. Anxiety and depression.  - She will increase her buspirone to twice daily.    2. Knee pain.  - She will be referred to physical therapy.      There are no Patient Instructions on file for this visit.    Follow Up:   Return in about 6 months (around 5/4/2023).    DO CYNTHIA BenjaminE [address]  [facility]  [address] [facility] [address]  Fax [phone]  Phone [phone]       Transcribed from ambient dictation for Pino Willis DO by Rochelle Cortez.  11/04/22   15:42 EDT    Patient or patient representative verbalized consent to the visit recording.  I have personally performed the services described in this document as transcribed by the above individual, and it is both accurate and complete.

## 2023-01-27 ENCOUNTER — LAB (OUTPATIENT)
Dept: LAB | Facility: HOSPITAL | Age: 33
End: 2023-01-27
Payer: COMMERCIAL

## 2023-01-27 ENCOUNTER — OFFICE VISIT (OUTPATIENT)
Dept: FAMILY MEDICINE CLINIC | Facility: CLINIC | Age: 33
End: 2023-01-27
Payer: COMMERCIAL

## 2023-01-27 VITALS
OXYGEN SATURATION: 99 % | HEART RATE: 98 BPM | BODY MASS INDEX: 44.45 KG/M2 | WEIGHT: 276.6 LBS | HEIGHT: 66 IN | TEMPERATURE: 98.7 F | DIASTOLIC BLOOD PRESSURE: 80 MMHG | SYSTOLIC BLOOD PRESSURE: 134 MMHG

## 2023-01-27 DIAGNOSIS — R42 LIGHTHEADEDNESS: ICD-10-CM

## 2023-01-27 DIAGNOSIS — R20.2 PARESTHESIA: ICD-10-CM

## 2023-01-27 DIAGNOSIS — R53.83 OTHER FATIGUE: ICD-10-CM

## 2023-01-27 DIAGNOSIS — N92.6 IRREGULAR MENSTRUAL CYCLE: Primary | ICD-10-CM

## 2023-01-27 DIAGNOSIS — D50.9 IRON DEFICIENCY ANEMIA, UNSPECIFIED IRON DEFICIENCY ANEMIA TYPE: ICD-10-CM

## 2023-01-27 DIAGNOSIS — H01.136 ECZEMA OF EYELID, LEFT: ICD-10-CM

## 2023-01-27 PROCEDURE — 82728 ASSAY OF FERRITIN: CPT

## 2023-01-27 PROCEDURE — 84466 ASSAY OF TRANSFERRIN: CPT

## 2023-01-27 PROCEDURE — 80050 GENERAL HEALTH PANEL: CPT

## 2023-01-27 PROCEDURE — 82607 VITAMIN B-12: CPT

## 2023-01-27 PROCEDURE — 82746 ASSAY OF FOLIC ACID SERUM: CPT

## 2023-01-27 PROCEDURE — 99214 OFFICE O/P EST MOD 30 MIN: CPT | Performed by: PHYSICIAN ASSISTANT

## 2023-01-27 PROCEDURE — 83540 ASSAY OF IRON: CPT

## 2023-01-27 NOTE — PROGRESS NOTES
Chief Complaint   Patient presents with   • Nausea   • Edema     Bilateral Legs   • Dry Eye     Pt. States around her eyelids          Sasha Stewart is a 32 y.o. female who presents for Nausea, Edema (Bilateral Legs), and Dry Eye (Pt. States around her eyelids )    Reports nausea, fatigue, lightheadedness, shortness of breath, dizziness, rapid heart rate at rest that started a few months ago and have been getting worse.  She states she is craving Lemonade and has a metal taste in her mouth.  Her menstrual cycle has been abnormal the last several months.  Last menstrual cycle was in November.  Had tubal ligation.  Not established with gynecology.    Patient reports history of iron deficiency and feels her symptoms are similar.    She reports new onset scaly skin on her left eyelid.  Has been applying topical creams without benefit.        Past Medical History:   Diagnosis Date   • Anemia    • Anxiety    • Asthma    • Depression    • Fibroid    • PID (pelvic inflammatory disease)        Past Surgical History:   Procedure Laterality Date   • BARIATRIC SURGERY     •  SECTION      2013   •  SECTION WITH TUBAL Bilateral 2016    Procedure:  SECTION REPEAT WITH TUBAL;  Surgeon: Chrissy Hampton MD;  Location: [facility] LABOR DELIVERY;  Service:    • GASTRIC SLEEVE LAPAROSCOPIC     • TUBAL ABDOMINAL LIGATION     • WISDOM TOOTH EXTRACTION         Family History   Problem Relation Age of Onset   • Diabetes Mother    • Hypertension Mother    • Arthritis Mother    • Obesity Mother    • Hypertension Father    • Coronary artery disease Paternal Grandfather    • Stroke Paternal Grandfather    • Hypertension Maternal Grandmother    • Arthritis Sister    • Obesity Sister    • Migraines Sister    • Obesity Brother    • Arthritis Maternal Grandfather        Social History     Socioeconomic History   • Marital status:    Tobacco Use   • Smoking status: Never     Passive exposure: Never  "  • Smokeless tobacco: Never   Vaping Use   • Vaping Use: Never used   Substance and Sexual Activity   • Alcohol use: No   • Drug use: No   • Sexual activity: Yes     Partners: Male     Birth control/protection: Tubal ligation       No Known Allergies    ROS    Review of Systems   Constitutional: Positive for fatigue. Negative for chills and fever.   Respiratory: Positive for shortness of breath. Negative for cough.    Cardiovascular: Negative for chest pain and palpitations.        Fast heart rate   Gastrointestinal: Positive for nausea. Negative for abdominal pain and vomiting.   Genitourinary: Positive for menstrual problem.   Skin:        Scaly skin on eyelid   Neurological: Positive for dizziness and light-headedness. Negative for headache.       Vitals:    01/27/23 1532   BP: 134/80   BP Location: Left arm   Patient Position: Sitting   Cuff Size: Large Adult   Pulse: 98   Temp: 98.7 °F (37.1 °C)   TempSrc: Temporal   SpO2: 99%   Weight: 125 kg (276 lb 9.6 oz)   Height: 167.6 cm (65.98\")   PainSc: 0-No pain     Body mass index is 44.67 kg/m².    Current Outpatient Medications on File Prior to Visit   Medication Sig Dispense Refill   • albuterol sulfate  (90 Base) MCG/ACT inhaler Inhale 2 puffs Every 4 (Four) Hours As Needed for Wheezing or Shortness of Air. 8 g 1   • amitriptyline (ELAVIL) 25 MG tablet Take 1.5 tablets by mouth Every Night. 135 tablet 0   • brompheniramine-pseudoephedrine-DM 30-2-10 MG/5ML syrup 1 to 2 teaspoons 4 times daily as needed cough congestion. 120 mL 0   • buPROPion SR (Wellbutrin SR) 150 MG 12 hr tablet Take 1 tablet by mouth 2 (Two) Times a Day. 60 tablet 11   • busPIRone (BUSPAR) 10 MG tablet Take 1 tablet by mouth 2 (Two) Times a Day. 60 tablet 11   • Elastic Bandages & Supports (Knee Wrap/Patellar Stablizing) misc Use on left knee daily 1 each 0   • famotidine (Pepcid) 20 MG tablet Take 1 tablet by mouth 2 (Two) Times a Day. 60 tablet 1   • fluticasone (Flonase) 50 MCG/ACT " nasal spray 2 sprays into the nostril(s) as directed by provider Daily. (Patient taking differently: 2 sprays into the nostril(s) as directed by provider As Needed.) 18.2 mL 2   • ondansetron (Zofran) 4 MG tablet Take 1 tablet by mouth Every 8 (Eight) Hours As Needed for Nausea or Vomiting. 21 tablet 0     No current facility-administered medications on file prior to visit.       Results for orders placed or performed during the hospital encounter of 11/01/22   Cov[facility] + [facility]&B [id number]    Specimen: Swab   Result Value Ref Range    COVID19 Presumptive Negative     Influenza A Antigen BETO Not Detected     Influenza B Antigen BETO Not Detected     Internal Control Passed     Lot Number [id number]     Expiration Date 03 08 23    POC Rapid Strep A    Specimen: Swab   Result Value Ref Range    Rapid Strep A Screen Negative     Internal Control Passed     Lot Number [id number]     Expiration Date 04 19 25        PE    Physical Exam  Vitals reviewed.   Constitutional:       General: She is not in acute distress.     Appearance: Normal appearance. She is well-developed. She is morbidly obese. She is not ill-appearing or diaphoretic.   HENT:      Head: Normocephalic and atraumatic.   Eyes:      Extraocular Movements: Extraocular movements intact.      Conjunctiva/sclera: Conjunctivae normal.     Pulmonary:      Effort: No respiratory distress.   Musculoskeletal:         General: Normal range of motion.      Cervical back: Normal range of motion.   Neurological:      General: No focal deficit present.      Mental Status: She is alert.   Psychiatric:         Attention and Perception: She is attentive.         Mood and Affect: Mood normal.         Speech: Speech normal.         Behavior: Behavior normal. Behavior is cooperative.         Thought Content: Thought content normal.         Judgment: Judgment normal.          A/P    Diagnoses and all orders for this visit:    1. Irregular menstrual cycle (Primary)  -      Ambulatory Referral to Obstetrics / Gynecology    2. Other fatigue  -     CBC (No Diff); Future  -     Comprehensive Metabolic Panel; Future  -     TSH Rfx On Abnormal To Free T4; Future  -     Ferritin; Future  -     Vitamin B12 & Folate; Future    3. Paresthesia  -     CBC (No Diff); Future  -     Comprehensive Metabolic Panel; Future  -     TSH Rfx On Abnormal To Free T4; Future  -     Ferritin; Future  -     Vitamin B12 & Folate; Future    4. Lightheadedness  -     CBC (No Diff); Future  -     Comprehensive Metabolic Panel; Future  -     TSH Rfx On Abnormal To Free T4; Future  -     Ferritin; Future  -     Vitamin B12 & Folate; Future    5. Eczema of eyelid, left  Avoid make-up.  Use mild soap to wash face like CeraVe.  Only apply Eucerin or Aquaphor twice daily to the surface of her eyelid.  If symptoms persist, would recommend appointment with dermatology.     Will check labs given symptoms and concern for iron deficiency.  Needs to establish with gynecology for evaluation of irregular menstrual cycle.  Follow-up in 2 weeks with PCP, Dr. Willis.  VSS.    Plan of care reviewed with patient at the conclusion of today's visit. Education was provided regarding diagnosis, management and any prescribed or recommended OTC medications.  Patient verbalizes understanding of and agreement with management plan.    Dictated Utilizing Dragon Dictation     Please note that portions of this note were completed with a voice recognition program.     Part of this note may be an electronic transcription/translation of spoken language to printed text using the Dragon Dictation System.    Return in about 2 weeks (around 2/10/2023) for Recheck w/Dr. Willis.     Noris Andersen PA-C

## 2023-01-28 DIAGNOSIS — E53.8 FOLATE DEFICIENCY: Primary | ICD-10-CM

## 2023-01-28 DIAGNOSIS — D50.9 IRON DEFICIENCY ANEMIA, UNSPECIFIED IRON DEFICIENCY ANEMIA TYPE: Primary | ICD-10-CM

## 2023-01-28 DIAGNOSIS — D50.0 IRON DEFICIENCY ANEMIA DUE TO CHRONIC BLOOD LOSS: Primary | ICD-10-CM

## 2023-01-28 LAB
ALBUMIN SERPL-MCNC: 3.5 G/DL (ref 3.5–5.2)
ALBUMIN/GLOB SERPL: 1 G/DL
ALP SERPL-CCNC: 116 U/L (ref 39–117)
ALT SERPL W P-5'-P-CCNC: 48 U/L (ref 1–33)
ANION GAP SERPL CALCULATED.3IONS-SCNC: 10.4 MMOL/L (ref 5–15)
AST SERPL-CCNC: 58 U/L (ref 1–32)
BILIRUB SERPL-MCNC: 0.3 MG/DL (ref 0–1.2)
BUN SERPL-MCNC: 5 MG/DL (ref 6–20)
BUN/CREAT SERPL: 7 (ref 7–25)
CALCIUM SPEC-SCNC: 8.9 MG/DL (ref 8.6–10.5)
CHLORIDE SERPL-SCNC: 103 MMOL/L (ref 98–107)
CO2 SERPL-SCNC: 28.6 MMOL/L (ref 22–29)
CREAT SERPL-MCNC: 0.71 MG/DL (ref 0.57–1)
DEPRECATED RDW RBC AUTO: 66.3 FL (ref 37–54)
EGFRCR SERPLBLD CKD-EPI 2021: 116 ML/MIN/1.73
ERYTHROCYTE [DISTWIDTH] IN BLOOD BY AUTOMATED COUNT: 18.9 % (ref 12.3–15.4)
FERRITIN SERPL-MCNC: 17.3 NG/ML (ref 13–150)
FOLATE SERPL-MCNC: <2 NG/ML (ref 4.78–24.2)
GLOBULIN UR ELPH-MCNC: 3.5 GM/DL
GLUCOSE SERPL-MCNC: 95 MG/DL (ref 65–99)
HCT VFR BLD AUTO: 33.7 % (ref 34–46.6)
HGB BLD-MCNC: 11.5 G/DL (ref 12–15.9)
IRON 24H UR-MRATE: 27 MCG/DL (ref 37–145)
IRON SATN MFR SERPL: 6 % (ref 20–50)
MCH RBC QN AUTO: 33.3 PG (ref 26.6–33)
MCHC RBC AUTO-ENTMCNC: 34.1 G/DL (ref 31.5–35.7)
MCV RBC AUTO: 97.7 FL (ref 79–97)
PLATELET # BLD AUTO: 481 10*3/MM3 (ref 140–450)
PMV BLD AUTO: 10.5 FL (ref 6–12)
POTASSIUM SERPL-SCNC: 3.7 MMOL/L (ref 3.5–5.2)
PROT SERPL-MCNC: 7 G/DL (ref 6–8.5)
RBC # BLD AUTO: 3.45 10*6/MM3 (ref 3.77–5.28)
SODIUM SERPL-SCNC: 142 MMOL/L (ref 136–145)
TIBC SERPL-MCNC: 490 MCG/DL (ref 298–536)
TRANSFERRIN SERPL-MCNC: 329 MG/DL (ref 200–360)
TSH SERPL DL<=0.05 MIU/L-ACNC: 1.57 UIU/ML (ref 0.27–4.2)
VIT B12 BLD-MCNC: 289 PG/ML (ref 211–946)
WBC NRBC COR # BLD: 7.62 10*3/MM3 (ref 3.4–10.8)

## 2023-01-28 RX ORDER — LANOLIN ALCOHOL/MO/W.PET/CERES
1000 CREAM (GRAM) TOPICAL DAILY
Qty: 90 TABLET | Refills: 1 | Status: SHIPPED | OUTPATIENT
Start: 2023-01-28

## 2023-01-28 RX ORDER — DOXYCYCLINE HYCLATE 50 MG/1
324 CAPSULE, GELATIN COATED ORAL
Qty: 90 TABLET | Refills: 0 | Status: SHIPPED | OUTPATIENT
Start: 2023-01-28

## 2023-01-28 RX ORDER — FOLIC ACID 1 MG/1
1 TABLET ORAL DAILY
Qty: 90 TABLET | Refills: 1 | Status: SHIPPED | OUTPATIENT
Start: 2023-01-28

## 2023-02-13 ENCOUNTER — OFFICE VISIT (OUTPATIENT)
Dept: FAMILY MEDICINE CLINIC | Facility: CLINIC | Age: 33
End: 2023-02-13
Payer: COMMERCIAL

## 2023-02-13 VITALS
WEIGHT: 273.2 LBS | TEMPERATURE: 97.3 F | SYSTOLIC BLOOD PRESSURE: 110 MMHG | HEART RATE: 97 BPM | BODY MASS INDEX: 44.12 KG/M2 | OXYGEN SATURATION: 98 % | DIASTOLIC BLOOD PRESSURE: 80 MMHG

## 2023-02-13 DIAGNOSIS — J02.0 STREP THROAT: Primary | ICD-10-CM

## 2023-02-13 DIAGNOSIS — F51.01 PRIMARY INSOMNIA: ICD-10-CM

## 2023-02-13 LAB
EXPIRATION DATE: ABNORMAL
INTERNAL CONTROL: ABNORMAL
Lab: ABNORMAL
S PYO AG THROAT QL: POSITIVE

## 2023-02-13 PROCEDURE — 99213 OFFICE O/P EST LOW 20 MIN: CPT | Performed by: FAMILY MEDICINE

## 2023-02-13 PROCEDURE — 87880 STREP A ASSAY W/OPTIC: CPT | Performed by: FAMILY MEDICINE

## 2023-02-13 RX ORDER — HYDROXYZINE HYDROCHLORIDE 25 MG/1
TABLET, FILM COATED ORAL
Qty: 30 TABLET | Refills: 2 | Status: SHIPPED | OUTPATIENT
Start: 2023-02-13

## 2023-02-13 RX ORDER — AMOXICILLIN AND CLAVULANATE POTASSIUM 875; 125 MG/1; MG/1
1 TABLET, FILM COATED ORAL 2 TIMES DAILY
Qty: 20 TABLET | Refills: 0 | Status: SHIPPED | OUTPATIENT
Start: 2023-02-13 | End: 2023-02-23

## 2023-02-13 RX ORDER — AMITRIPTYLINE HYDROCHLORIDE 25 MG/1
25 TABLET, FILM COATED ORAL NIGHTLY
Qty: 90 TABLET | Refills: 1 | Status: SHIPPED | OUTPATIENT
Start: 2023-02-13

## 2023-02-13 RX ORDER — ONDANSETRON 4 MG/1
4 TABLET, FILM COATED ORAL EVERY 8 HOURS PRN
Qty: 21 TABLET | Refills: 0 | Status: SHIPPED | OUTPATIENT
Start: 2023-02-13

## 2023-02-13 NOTE — PROGRESS NOTES
Established Patient Office Visit      Patient Name: Sasha Stewart  : 1990   MRN: [id number]   Care Team: Patient Care Team:  Pino Willis DO as PCP - General (Family Medicine)    Chief Complaint:    Chief Complaint   Patient presents with   • Sore Throat       History of Present Illness: Sasha Stewart is a 32 y.o. female who is here today for chief complaint.    HPI  The patient presents today ostensibly for a 2-week follow-up. She was seen for a same day appointment, by MARILOU Cameron, for nausea, bilateral lower extremity edema, and dry skin around her eyelids. She also remarked on irregular menstrual cycle and was referred to gynecology. She was placed on a vitamin B12 supplement, folate supplement, and an iron supplement.    She reports that the iron pills make her nauseated, so she bought some iron supplement gummies, which have been better. She asks if she can be prescribed more pills for nausea. She has not gotten in contact with her gynecologist yet. She reports that the gynecologist called, but it was from a local number, so she did not answer. She asks if there is any reason for concern over her liver enzymes.    She used to take sleeping pills in the past because she had difficulty sleeping, which did not happen for a while. She finds that she is having difficulty sleeping again, even if she takes a melatonin supplement before sleeping. She used to take 2 different medicines for anxiety and sleep. She reports that the last time she was on amitriptyline and hydroxyzine was months ago, and she confirms that they were helpful. She confirms that she would like a work excuse.     This patient is accompanied by their self who contributes to the history of their care.    This patient is accompanied by their self who contributes to the history of their care.    The following portions of the patient's history were reviewed and updated as appropriate: allergies,  current medications, past family history, past medical history, past social history, past surgical history and problem list.    Subjective      Review of Systems:   Review of Systems   Gastrointestinal: Positive for nausea.    - See HPI    Past Medical History:   Past Medical History:   Diagnosis Date   • Anemia    • Anxiety    • Asthma    • Depression    • Fibroid    • PID (pelvic inflammatory disease)        Past Surgical History:   Past Surgical History:   Procedure Laterality Date   • BARIATRIC SURGERY     •  SECTION      ,    •  SECTION WITH TUBAL Bilateral 2016    Procedure:  SECTION REPEAT WITH TUBAL;  Surgeon: Chrissy Hampton MD;  [facility];  Service:    • GASTRIC SLEEVE LAPAROSCOPIC     • TUBAL ABDOMINAL LIGATION     • WISDOM TOOTH EXTRACTION         Family History:   Family History   Problem Relation Age of Onset   • Diabetes Mother    • Hypertension Mother    • Arthritis Mother    • Obesity Mother    • Hypertension Father    • Coronary artery disease Paternal Grandfather    • Stroke Paternal Grandfather    • Hypertension Maternal Grandmother    • Arthritis Sister    • Obesity Sister    • Migraines Sister    • Obesity Brother    • Arthritis Maternal Grandfather        Social History:   Social History     Socioeconomic History   • Marital status:    Tobacco Use   • Smoking status: Never     Passive exposure: Never   • Smokeless tobacco: Never   Vaping Use   • Vaping Use: Never used   Substance and Sexual Activity   • Alcohol use: No   • Drug use: No   • Sexual activity: Yes     Partners: Male     Birth control/protection: Tubal ligation       Tobacco History:   Social History     Tobacco Use   Smoking Status Never   • Passive exposure: Never   Smokeless Tobacco Never       Medications:     Current Outpatient Medications:   •  albuterol sulfate  (90 Base) MCG/ACT inhaler, Inhale 2 puffs Every 4 (Four) Hours As Needed for Wheezing or  Shortness of Air., Disp: 8 g, Rfl: 1  •  buPROPion SR (Wellbutrin SR) 150 MG 12 hr tablet, Take 1 tablet by mouth 2 (Two) Times a Day., Disp: 60 tablet, Rfl: 11  •  busPIRone (BUSPAR) 10 MG tablet, Take 1 tablet by mouth 2 (Two) Times a Day., Disp: 60 tablet, Rfl: 11  •  famotidine (Pepcid) 20 MG tablet, Take 1 tablet by mouth 2 (Two) Times a Day., Disp: 60 tablet, Rfl: 1  •  ferrous gluconate (FERGON) 324 MG tablet, Take 1 tablet by mouth Daily With Breakfast., Disp: 90 tablet, Rfl: 0  •  fluticasone (Flonase) 50 MCG/ACT nasal spray, 2 sprays into the nostril(s) as directed by provider Daily. (Patient taking differently: 2 sprays into the nostril(s) as directed by provider As Needed.), Disp: 18.2 mL, Rfl: 2  •  folic acid (FOLVITE) 1 MG tablet, Take 1 tablet by mouth Daily., Disp: 90 tablet, Rfl: 1  •  ondansetron (Zofran) 4 MG tablet, Take 1 tablet by mouth Every 8 (Eight) Hours As Needed for Nausea or Vomiting., Disp: 21 tablet, Rfl: 0  •  vitamin B-12 (CYANOCOBALAMIN) 1000 MCG tablet, Take 1 tablet by mouth Daily., Disp: 90 tablet, Rfl: 1  •  amitriptyline (ELAVIL) 25 MG tablet, Take 1 tablet by mouth Every Night., Disp: 90 tablet, Rfl: 1  •  amoxicillin-clavulanate (Augmentin) 875-125 MG per tablet, Take 1 tablet by mouth 2 (Two) Times a Day for 10 days., Disp: 20 tablet, Rfl: 0  •  Elastic Bandages & Supports (Knee Wrap/Patellar Stablizing) misc, Use on left knee daily, Disp: 1 each, Rfl: 0  •  hydrOXYzine (ATARAX) 25 MG tablet, Take 1 tablet by mouth 30-60 minutes before bedtime as needed for insomnia, allowing at least 7 hours before scheduled wake-up time., Disp: 30 tablet, Rfl: 2    Allergies:   No Known Allergies    Objective   Objective     Physical Exam:  Vital Signs:   Vitals:    02/13/23 1605   BP: 110/80   BP Location: Left arm   Patient Position: Sitting   Cuff Size: Adult   Pulse: 97   Temp: 97.3 °F (36.3 °C)   TempSrc: Infrared   SpO2: 98%   Weight: 124 kg (273 lb 3.2 oz)     Body mass index is  44.12 kg/m².     Physical Exam  Nursing note reviewed  Const: NAD, A&Ox4, Pleasant, Cooperative  Eyes: EOMI, no conjunctivitis  ENT: No nasal discharge present, neck supple  Cardiac: Regular rate and rhythm, no cyanosis  Resp: Respiratory rate within normal limits, no increased work of breathing, no audible wheezing or retractions noted  GI: No distention or ascites  MSK: Motor and sensation grossly intact in bilateral upper extremities  Neurologic: CN II-XII grossly intact  Psych: Appropriate mood and behavior.  Skin: Warm, dry  Procedures/Radiology     Procedures  No radiology results for the last 7 days     Assessment & Plan   Assessment / Plan      Assessment/Plan:   Problems Addressed This Visit  Diagnoses and all orders for this visit:    1. Strep throat (Primary)  -     POCT rapid strep A  -     amoxicillin-clavulanate (Augmentin) 875-125 MG per tablet; Take 1 tablet by mouth 2 (Two) Times a Day for 10 days.  Dispense: 20 tablet; Refill: 0    2. Primary insomnia  -     hydrOXYzine (ATARAX) 25 MG tablet; Take 1 tablet by mouth 30-60 minutes before bedtime as needed for insomnia, allowing at least 7 hours before scheduled wake-up time.  Dispense: 30 tablet; Refill: 2  -     amitriptyline (ELAVIL) 25 MG tablet; Take 1 tablet by mouth Every Night.  Dispense: 90 tablet; Refill: 1    Other orders  -     ondansetron (Zofran) 4 MG tablet; Take 1 tablet by mouth Every 8 (Eight) Hours As Needed for Nausea or Vomiting.  Dispense: 21 tablet; Refill: 0      Problem List Items Addressed This Visit    None  Visit Diagnoses     Strep throat    -  Primary    Relevant Medications    amoxicillin-clavulanate (Augmentin) 875-125 MG per tablet    Other Relevant Orders    POCT rapid strep A (Completed)    Primary insomnia        Relevant Medications    hydrOXYzine (ATARAX) 25 MG tablet    amitriptyline (ELAVIL) 25 MG tablet          There are no Patient Instructions on file for this visit.    Follow Up:   Return in about 3 months  (around 5/13/2023) for video visit, (non-fasting blood work 1 week prior to appt).    MDM     Transcribed from ambient dictation for Pino Willis,  by Bonnie Alvarez.  02/13/23   17:31 EST    Patient or patient representative verbalized consent to the visit recording.  I have personally performed the services described in this document as transcribed by the above individual, and it is both accurate and complete.    [address]  [facility]  [address]  [facility] [address]  Fax [phone]  Phone [phone]

## 2023-05-07 PROCEDURE — 87205 SMEAR GRAM STAIN: CPT | Performed by: NURSE PRACTITIONER

## 2023-05-07 PROCEDURE — 87070 CULTURE OTHR SPECIMN AEROBIC: CPT | Performed by: NURSE PRACTITIONER

## 2023-08-07 ENCOUNTER — OFFICE VISIT (OUTPATIENT)
Dept: FAMILY MEDICINE CLINIC | Facility: CLINIC | Age: 33
End: 2023-08-07
Payer: COMMERCIAL

## 2023-08-07 ENCOUNTER — LAB (OUTPATIENT)
Dept: LAB | Facility: HOSPITAL | Age: 33
End: 2023-08-07
Payer: COMMERCIAL

## 2023-08-07 VITALS
HEIGHT: 65 IN | DIASTOLIC BLOOD PRESSURE: 88 MMHG | BODY MASS INDEX: 43.99 KG/M2 | SYSTOLIC BLOOD PRESSURE: 124 MMHG | HEART RATE: 84 BPM | OXYGEN SATURATION: 99 % | WEIGHT: 264 LBS

## 2023-08-07 DIAGNOSIS — D50.0 IRON DEFICIENCY ANEMIA DUE TO CHRONIC BLOOD LOSS: ICD-10-CM

## 2023-08-07 DIAGNOSIS — N92.6 IRREGULAR MENSTRUAL CYCLE: ICD-10-CM

## 2023-08-07 DIAGNOSIS — N92.6 IRREGULAR MENSTRUAL CYCLE: Primary | ICD-10-CM

## 2023-08-07 DIAGNOSIS — N93.9 VAGINAL BLEEDING: ICD-10-CM

## 2023-08-07 DIAGNOSIS — R10.2 PELVIC PAIN: ICD-10-CM

## 2023-08-07 LAB — HCG INTACT+B SERPL-ACNC: <1 MIU/ML

## 2023-08-07 PROCEDURE — 84702 CHORIONIC GONADOTROPIN TEST: CPT

## 2023-08-07 PROCEDURE — 99214 OFFICE O/P EST MOD 30 MIN: CPT | Performed by: PHYSICIAN ASSISTANT

## 2023-08-07 NOTE — ASSESSMENT & PLAN NOTE
Reviewed January labs. TSH normal.   Check t/v ultrasound and refer to gynecology.   hCG to r/o ectopic pregnancy. Very unlikely given hx tubal ligation.   ER for severe pain, uncontrolled bleeding discussed.

## 2023-08-07 NOTE — PROGRESS NOTES
"    Chief Complaint   Patient presents with    abnormal vaginal bleeding     Pt states that she completed her menses 2 weeks ago and then started bleeding again  on Saturday. Extremely heavy bleeding.     Abdominal Pain       HPI     Sasha Monique Stewart is a pleasant 32 y.o. female with a PMH of uterine fibroids and iron deficiency anemia who presents for evaluation of \"chief complaint.\"     Patient reports she started missing periods every other month last year. In May she had vaginal bleeding for 3 weeks. She has had some heavy vaginal bleeding, clotting and pain starting 2 days ago. She denies changes to her medications. She is sexually active but had a tubal ligation in . No fever or abnormal vaginal discharge per her report. She takes iron only a couple of times per week due to nausea. She was referred to gynecology by my colleague Noris in January this year but has not been seen.     Past Medical History:   Diagnosis Date    Anemia     Anxiety     Asthma     Depression     Fibroid     GERD (gastroesophageal reflux disease)     PID (pelvic inflammatory disease)     Seasonal allergies        Past Surgical History:   Procedure Laterality Date    BARIATRIC SURGERY       SECTION      ,      SECTION WITH TUBAL Bilateral 2016    Procedure:  SECTION REPEAT WITH TUBAL;  Surgeon: Chrissy Hampton MD;  Location: On license of [facility] LABOR DELIVERY;  Service:     GASTRIC SLEEVE LAPAROSCOPIC      TUBAL ABDOMINAL LIGATION      WISDOM TOOTH EXTRACTION         Family History   Problem Relation Age of Onset    Diabetes Mother     Hypertension Mother     Arthritis Mother     Obesity Mother     Hypertension Father     Coronary artery disease Paternal Grandfather     Stroke Paternal Grandfather     Hypertension Maternal Grandmother     Arthritis Sister     Obesity Sister     Migraines Sister     Obesity Brother     Arthritis Maternal Grandfather        Social History     Socioeconomic History    " Marital status:    Tobacco Use    Smoking status: Never     Passive exposure: Never    Smokeless tobacco: Never   Vaping Use    Vaping Use: Never used   Substance and Sexual Activity    Alcohol use: No    Drug use: No    Sexual activity: Yes     Partners: Male     Birth control/protection: Tubal ligation       No Known Allergies    ROS    Review of Systems   Constitutional:  Negative for chills and fever.   Genitourinary:  Positive for menstrual problem, pelvic pain and vaginal bleeding.   Neurological:  Negative for syncope and light-headedness.     Vitals:    08/07/23 1249   BP: 124/88   Pulse: 84   SpO2: 99%     Body mass index is 43.93 kg/mý.      Current Outpatient Medications:     albuterol sulfate  (90 Base) MCG/ACT inhaler, Inhale 2 puffs Every 4 (Four) Hours As Needed for Wheezing., Disp: 18 g, Rfl: 0    amitriptyline (ELAVIL) 25 MG tablet, Take 1 tablet by mouth Every Night., Disp: 90 tablet, Rfl: 1    buPROPion SR (Wellbutrin SR) 150 MG 12 hr tablet, Take 1 tablet by mouth 2 (Two) Times a Day., Disp: 60 tablet, Rfl: 11    busPIRone (BUSPAR) 10 MG tablet, Take 1 tablet by mouth 2 (Two) Times a Day., Disp: 60 tablet, Rfl: 11    cetirizine (zyrTEC) 10 MG tablet, Take 1 tablet by mouth Daily., Disp: 30 tablet, Rfl: 0    famotidine (Pepcid) 20 MG tablet, Take 1 tablet by mouth 2 (Two) Times a Day., Disp: 60 tablet, Rfl: 1    fluticasone (Flonase) 50 MCG/ACT nasal spray, 2 sprays into the nostril(s) as directed by provider Daily. (Patient taking differently: 2 sprays into the nostril(s) as directed by provider As Needed.), Disp: 18.2 mL, Rfl: 2    folic acid (FOLVITE) 1 MG tablet, Take 1 tablet by mouth Daily., Disp: 90 tablet, Rfl: 1    hydrOXYzine (ATARAX) 25 MG tablet, Take 1 tablet by mouth 30-60 minutes before bedtime as needed for insomnia, allowing at least 7 hours before scheduled wake-up time., Disp: 30 tablet, Rfl: 2    ondansetron (Zofran) 4 MG tablet, Take 1 tablet by mouth Every 8  (Eight) Hours As Needed for Nausea or Vomiting., Disp: 21 tablet, Rfl: 0    promethazine-dextromethorphan (PROMETHAZINE-DM) 6.25-15 MG/5ML syrup, Take 5 mL by mouth 4 (Four) Times a Day As Needed for Cough., Disp: 118 mL, Rfl: 0    vitamin B-12 (CYANOCOBALAMIN) 1000 MCG tablet, Take 1 tablet by mouth Daily., Disp: 90 tablet, Rfl: 1    PE    Physical Exam  Vitals reviewed.   Constitutional:       General: She is not in acute distress.     Appearance: She is obese.   Pulmonary:      Effort: Pulmonary effort is normal. No respiratory distress.   Abdominal:      Tenderness: There is abdominal tenderness in the left lower quadrant.       Neurological:      Mental Status: She is alert.   Psychiatric:         Mood and Affect: Mood normal.        A/P    Problem List Items Addressed This Visit          Genitourinary and Reproductive     Irregular menstrual cycle - Primary    Current Assessment & Plan     Reviewed January labs. TSH normal.   Check t/v ultrasound and refer to gynecology.   hCG to r/o ectopic pregnancy. Very unlikely given hx tubal ligation.   ER for severe pain, uncontrolled bleeding discussed.            Relevant Orders    Ambulatory Referral to Gynecology    hCG, Quantitative, Pregnancy       Hematology and Neoplasia    Iron deficiency anemia    Current Assessment & Plan     Hgb 11.5 in January.   Not tolerating oral iron well.   Try 2 chewable Flinstones children's vitamins daily OTC.          Relevant Medications    folic acid (FOLVITE) 1 MG tablet    vitamin B-12 (CYANOCOBALAMIN) 1000 MCG tablet     Other Visit Diagnoses       Pelvic pain        Relevant Orders    US Non-ob Transvaginal    Vaginal bleeding        Relevant Orders    Ambulatory Referral to Gynecology    US Non-ob Transvaginal            Plan of care was reviewed with patient at the conclusion of today's visit. Education was provided regarding diagnoses, management, prescribed or recommended OTC products, and the importance of compliance  with follow-up appointments. The patient was counseled regarding the risks, benefits, and possible side-effects of treatment. I advised the patient to keep me informed of any acute changes in their status including new, worsening, or persistent symptoms. Patient expresses understanding and agreement with the management plan.        MARILOU Howell

## 2023-08-07 NOTE — ASSESSMENT & PLAN NOTE
Hgb 11.5 in January.   Not tolerating oral iron well.   Try 2 chewable Flinstones children's vitamins daily OTC.

## 2023-08-08 ENCOUNTER — HOSPITAL ENCOUNTER (EMERGENCY)
Facility: HOSPITAL | Age: 33
Discharge: HOME | End: 2023-08-08
Attending: EMERGENCY MEDICINE | Admitting: EMERGENCY MEDICINE
Payer: COMMERCIAL

## 2023-08-08 VITALS
TEMPERATURE: 98.6 F | HEART RATE: 76 BPM | HEIGHT: 66 IN | WEIGHT: 263 LBS | BODY MASS INDEX: 42.27 KG/M2 | RESPIRATION RATE: 16 BRPM | DIASTOLIC BLOOD PRESSURE: 80 MMHG | SYSTOLIC BLOOD PRESSURE: 119 MMHG | OXYGEN SATURATION: 98 %

## 2023-08-08 DIAGNOSIS — D64.9 ANEMIA, UNSPECIFIED TYPE: ICD-10-CM

## 2023-08-08 DIAGNOSIS — N93.9 ABNORMAL UTERINE BLEEDING: Primary | ICD-10-CM

## 2023-08-08 DIAGNOSIS — R31.9 URINARY TRACT INFECTION WITH HEMATURIA, SITE UNSPECIFIED: ICD-10-CM

## 2023-08-08 DIAGNOSIS — N39.0 URINARY TRACT INFECTION WITH HEMATURIA, SITE UNSPECIFIED: ICD-10-CM

## 2023-08-08 LAB
B-HCG UR QL: NEGATIVE
BACTERIA UR QL AUTO: ABNORMAL /HPF
BASOPHILS # BLD AUTO: 0.05 10*3/MM3 (ref 0–0.2)
BASOPHILS NFR BLD AUTO: 1.1 % (ref 0–1.5)
BILIRUB UR QL STRIP: ABNORMAL
CLARITY UR: ABNORMAL
COLOR UR: ABNORMAL
DEPRECATED RDW RBC AUTO: 42.1 FL (ref 37–54)
EOSINOPHIL # BLD AUTO: 0.15 10*3/MM3 (ref 0–0.4)
EOSINOPHIL NFR BLD AUTO: 3.4 % (ref 0.3–6.2)
ERYTHROCYTE [DISTWIDTH] IN BLOOD BY AUTOMATED COUNT: 13.5 % (ref 12.3–15.4)
GLUCOSE UR STRIP-MCNC: ABNORMAL MG/DL
HCT VFR BLD AUTO: 32.3 % (ref 34–46.6)
HGB BLD-MCNC: 9.7 G/DL (ref 12–15.9)
HGB UR QL STRIP.AUTO: ABNORMAL
HOLD SPECIMEN: NORMAL
HYALINE CASTS UR QL AUTO: ABNORMAL /LPF
IMM GRANULOCYTES # BLD AUTO: 0.02 10*3/MM3 (ref 0–0.05)
IMM GRANULOCYTES NFR BLD AUTO: 0.5 % (ref 0–0.5)
KETONES UR QL STRIP: ABNORMAL
LEUKOCYTE ESTERASE UR QL STRIP.AUTO: ABNORMAL
LYMPHOCYTES # BLD AUTO: 0.77 10*3/MM3 (ref 0.7–3.1)
LYMPHOCYTES NFR BLD AUTO: 17.3 % (ref 19.6–45.3)
MCH RBC QN AUTO: 25.5 PG (ref 26.6–33)
MCHC RBC AUTO-ENTMCNC: 30 G/DL (ref 31.5–35.7)
MCV RBC AUTO: 85 FL (ref 79–97)
MONOCYTES # BLD AUTO: 0.33 10*3/MM3 (ref 0.1–0.9)
MONOCYTES NFR BLD AUTO: 7.4 % (ref 5–12)
NEUTROPHILS NFR BLD AUTO: 3.12 10*3/MM3 (ref 1.7–7)
NEUTROPHILS NFR BLD AUTO: 70.3 % (ref 42.7–76)
NITRITE UR QL STRIP: POSITIVE
NRBC BLD AUTO-RTO: 0 /100 WBC (ref 0–0.2)
PH UR STRIP.AUTO: 6.5 [PH] (ref 5–8)
PLATELET # BLD AUTO: 362 10*3/MM3 (ref 140–450)
PMV BLD AUTO: 10.5 FL (ref 6–12)
PROT UR QL STRIP: ABNORMAL
RBC # BLD AUTO: 3.8 10*6/MM3 (ref 3.77–5.28)
RBC # UR STRIP: ABNORMAL /HPF
REF LAB TEST METHOD: ABNORMAL
SP GR UR STRIP: 1.02 (ref 1–1.03)
SQUAMOUS #/AREA URNS HPF: ABNORMAL /HPF
UROBILINOGEN UR QL STRIP: ABNORMAL
WBC # UR STRIP: ABNORMAL /HPF
WBC NRBC COR # BLD: 4.44 10*3/MM3 (ref 3.4–10.8)
WHOLE BLOOD HOLD COAG: NORMAL
WHOLE BLOOD HOLD SPECIMEN: NORMAL

## 2023-08-08 PROCEDURE — 85025 COMPLETE CBC W/AUTO DIFF WBC: CPT

## 2023-08-08 PROCEDURE — 96374 THER/PROPH/DIAG INJ IV PUSH: CPT

## 2023-08-08 PROCEDURE — 81001 URINALYSIS AUTO W/SCOPE: CPT | Performed by: EMERGENCY MEDICINE

## 2023-08-08 PROCEDURE — 99283 EMERGENCY DEPT VISIT LOW MDM: CPT

## 2023-08-08 PROCEDURE — 87086 URINE CULTURE/COLONY COUNT: CPT | Performed by: PHYSICIAN ASSISTANT

## 2023-08-08 PROCEDURE — 81025 URINE PREGNANCY TEST: CPT | Performed by: EMERGENCY MEDICINE

## 2023-08-08 PROCEDURE — 25010000002 KETOROLAC TROMETHAMINE PER 15 MG: Performed by: PHYSICIAN ASSISTANT

## 2023-08-08 RX ORDER — KETOROLAC TROMETHAMINE 15 MG/ML
15 INJECTION, SOLUTION INTRAMUSCULAR; INTRAVENOUS ONCE
Status: COMPLETED | OUTPATIENT
Start: 2023-08-08 | End: 2023-08-08

## 2023-08-08 RX ORDER — SODIUM CHLORIDE 0.9 % (FLUSH) 0.9 %
10 SYRINGE (ML) INJECTION AS NEEDED
Status: DISCONTINUED | OUTPATIENT
Start: 2023-08-08 | End: 2023-08-08 | Stop reason: HOSPADM

## 2023-08-08 RX ORDER — CEFDINIR 300 MG/1
300 CAPSULE ORAL 2 TIMES DAILY
Qty: 20 CAPSULE | Refills: 0 | Status: SHIPPED | OUTPATIENT
Start: 2023-08-08

## 2023-08-08 RX ADMIN — KETOROLAC TROMETHAMINE 15 MG: 15 INJECTION, SOLUTION INTRAMUSCULAR; INTRAVENOUS at 09:03

## 2023-08-08 RX ADMIN — SODIUM CHLORIDE 1000 ML: 9 INJECTION, SOLUTION INTRAVENOUS at 09:02

## 2023-08-08 NOTE — DISCHARGE INSTRUCTIONS
Rest.  Omnicef as prescribed for UTI.  Tylenol or Motrin as directed for crampy pelvic pain.  Follow-up with gynecology (Dr. Canavan) on this Thursday the 10th at 10:45 (arrive 15 min early for paperwork).

## 2023-08-08 NOTE — ED PROVIDER NOTES
Subjective   History of Present Illness  Ms. Stewart is a 33-year-old female with a history of depression, GERD, previous uterine fibroids 10 years ago, who presents to the emergency department with complaints of abnormal vaginal bleeding and irregular periods.  The patient states that she has had irregular periods since May.  She states that her last menstrual period was 2 weeks ago and then she started bleeding again yesterday.  She has some pelvic cramping and feels lightheaded.  She states that she has had previous bilateral tubal ligation.  She saw her PCP previously for this and was told that she was going to be referred to a gynecologist.  She has not heard anything about an appointment yet.    Review of Systems   Constitutional:  Negative for chills and fever.   HENT:  Negative for sore throat.    Respiratory:  Negative for cough.    Cardiovascular:  Negative for chest pain.   Gastrointestinal:  Negative for diarrhea, nausea and vomiting.   Genitourinary:  Positive for pelvic pain (cramping) and vaginal bleeding. Negative for dysuria.   Musculoskeletal:  Positive for back pain (low back).   Skin:  Negative for pallor.   Neurological:  Positive for light-headedness.   Hematological: Negative.    Psychiatric/Behavioral: Negative.       Past Medical History:   Diagnosis Date    Anemia     Anxiety     Asthma     Depression     Fibroid     GERD (gastroesophageal reflux disease)     PID (pelvic inflammatory disease)     Seasonal allergies        No Known Allergies    Past Surgical History:   Procedure Laterality Date    BARIATRIC SURGERY       SECTION      ,      SECTION WITH TUBAL Bilateral 2016    Procedure:  SECTION REPEAT WITH TUBAL;  Surgeon: Chrissy Hampton MD;  Location: [facility] LABOR DELIVERY;  Service:     GASTRIC SLEEVE LAPAROSCOPIC      TUBAL ABDOMINAL LIGATION      WISDOM TOOTH EXTRACTION         Family History   Problem Relation Age of Onset    Diabetes Mother      Hypertension Mother     Arthritis Mother     Obesity Mother     Hypertension Father     Coronary artery disease Paternal Grandfather     Stroke Paternal Grandfather     Hypertension Maternal Grandmother     Arthritis Sister     Obesity Sister     Migraines Sister     Obesity Brother     Arthritis Maternal Grandfather        Social History     Socioeconomic History    Marital status:    Tobacco Use    Smoking status: Never     Passive exposure: Never    Smokeless tobacco: Never   Vaping Use    Vaping Use: Never used   Substance and Sexual Activity    Alcohol use: No    Drug use: No    Sexual activity: Yes     Partners: Male     Birth control/protection: Tubal ligation           Objective   Physical Exam  Constitutional:       General: She is not in acute distress.     Appearance: Normal appearance.   HENT:      Nose: Nose normal.      Mouth/Throat:      Mouth: Mucous membranes are moist.   Eyes:      General: No scleral icterus.     Conjunctiva/sclera: Conjunctivae normal.      Pupils: Pupils are equal, round, and reactive to light.   Cardiovascular:      Rate and Rhythm: Normal rate and regular rhythm.      Pulses: Normal pulses.   Pulmonary:      Effort: Pulmonary effort is normal.   Abdominal:      General: Bowel sounds are normal.      Tenderness: There is no abdominal tenderness. There is no guarding.   Genitourinary:     Comments: Moderate blood in vaginal vault.  No clots currently.  Cleared with 2 laser swabs.  Slow ooze of dark blood from cervical os.    Musculoskeletal:      Cervical back: Normal range of motion and neck supple.      Right lower leg: No edema.      Left lower leg: No edema.   Skin:     General: Skin is warm and dry.      Coloration: Skin is not pale.   Neurological:      Mental Status: She is alert and oriented to person, place, and time.   Psychiatric:         Mood and Affect: Mood normal.       Procedures           ED Course      In summary, 33-year-old female presents with  irregular and heavy vaginal bleeding.  The patient states that she has had irregular menses since May of this year.  Her last menstrual period was 2 weeks ago and then she began having some vaginal bleeding and cramping again over the past 24 hours.  She states that she is passing large clots.  She has no associated nausea or vomiting.  No dysuria.    MDM: Differential includes dysfunctional uterine bleeding, pregnancy, fibroid uterus, malignancy, anemia, etc.    Labs and urine pregnancy test obtained.  Patient given a dose of Toradol for pelvic cramps.    H&H is currently 9.7/32.3 (down from 11.5/33.7 six months ago).    HCG is negative.      UA shows TNTC RBC.  Unable to determine bacteria presence due to loaded field.  6-12 WBC.   Nitrite positive.    Pt has moderate vaginal bleeding currently.      I reviewed her notes.  She has been seen as far back as January by her PCP for irregular periods but has not yet seen gynecology.      I contacted Gynecology on-call (Allison Canavan, MD) and they advised that they will see her on Thursday (in 2 days) in office at 10:45.  No indication for admission or other emergent intervention today.  Spoke with pt about her work up and the plan for follow up.  She is agreeable with the plan.  I will culture her urine and treat with abx as she has some dysuria.                                         Medical Decision Making  Amount and/or Complexity of Data Reviewed  Labs: ordered.    Risk  Prescription drug management.        Final diagnoses:   Abnormal uterine bleeding   Anemia, unspecified type   Urinary tract infection with hematuria, site unspecified       ED Disposition  ED Disposition       ED Disposition   Discharge    Condition   Stable    Comment   --               Canavan, Allison, MD  [address] [place], [place] [id number]  [phone]      you have an appointment for this Thursday the 10th at 10:45 am.  Arrive 15 minutes early for paper work.    [facility] EMERGENCY DEPARTMENT  [address] [address]  [phone]    If symptoms worsen         Medication List        New Prescriptions      cefdinir 300 MG capsule  Commonly known as: OMNICEF  Take 1 capsule by mouth 2 (Two) Times a Day.            Changed      fluticasone 50 MCG/ACT nasal spray  Commonly known as: Flonase  2 sprays into the nostril(s) as directed by provider Daily.  What changed:   when to take this  reasons to take this               Where to Get Your Medications        These medications were sent to [facility] [address] [place] [address] - [address] [place] [address] [phone]  - [phone] [address] [address]      Phone: [phone]   cefdinir 300 MG capsule            Arnol Gutierrez, PA  08/08/23 9471

## 2023-08-09 LAB — BACTERIA SPEC AEROBE CULT: NORMAL

## 2023-08-23 ENCOUNTER — HOSPITAL ENCOUNTER (OUTPATIENT)
Dept: ULTRASOUND IMAGING | Facility: HOSPITAL | Age: 33
Discharge: HOME | End: 2023-08-23
Admitting: PHYSICIAN ASSISTANT
Payer: COMMERCIAL

## 2023-08-23 DIAGNOSIS — R10.2 PELVIC PAIN: ICD-10-CM

## 2023-08-23 DIAGNOSIS — N93.9 VAGINAL BLEEDING: ICD-10-CM

## 2023-08-23 PROCEDURE — 76830 TRANSVAGINAL US NON-OB: CPT

## 2023-08-24 ENCOUNTER — TELEPHONE (OUTPATIENT)
Dept: FAMILY MEDICINE CLINIC | Facility: CLINIC | Age: 33
End: 2023-08-24

## 2023-08-24 NOTE — TELEPHONE ENCOUNTER
Caller: Mary Stewart    Relationship: Self    Best call back number: [phone]     Caller requesting test results: MARY    What test was performed: ULTRASOUND    When was the test performed: 8/23/23    Additional notes: PLEASE ADVISE WHEN RESULTS ARE AVAILABLE.

## 2023-08-25 ENCOUNTER — OFFICE VISIT (OUTPATIENT)
Dept: FAMILY MEDICINE CLINIC | Facility: CLINIC | Age: 33
End: 2023-08-25
Payer: COMMERCIAL

## 2023-08-25 VITALS
HEIGHT: 66 IN | WEIGHT: 259.6 LBS | OXYGEN SATURATION: 99 % | SYSTOLIC BLOOD PRESSURE: 122 MMHG | BODY MASS INDEX: 41.72 KG/M2 | HEART RATE: 84 BPM | DIASTOLIC BLOOD PRESSURE: 78 MMHG

## 2023-08-25 DIAGNOSIS — R30.0 BURNING WITH URINATION: Primary | ICD-10-CM

## 2023-08-25 DIAGNOSIS — R93.5 ABNORMAL ULTRASOUND OF ENDOMETRIUM: ICD-10-CM

## 2023-08-25 PROBLEM — N30.00 ACUTE CYSTITIS: Status: ACTIVE | Noted: 2023-08-25

## 2023-08-25 LAB
BILIRUB BLD-MCNC: NEGATIVE MG/DL
CLARITY, POC: CLEAR
COLOR UR: YELLOW
EXPIRATION DATE: ABNORMAL
GLUCOSE UR STRIP-MCNC: NEGATIVE MG/DL
KETONES UR QL: NEGATIVE
LEUKOCYTE EST, POC: NEGATIVE
Lab: ABNORMAL
NITRITE UR-MCNC: NEGATIVE MG/ML
PH UR: 5.5 [PH] (ref 5–8)
PROT UR STRIP-MCNC: ABNORMAL MG/DL
RBC # UR STRIP: NEGATIVE /UL
SP GR UR: 1.03 (ref 1–1.03)
UROBILINOGEN UR QL: NORMAL

## 2023-08-25 PROCEDURE — 99214 OFFICE O/P EST MOD 30 MIN: CPT | Performed by: INTERNAL MEDICINE

## 2023-08-25 PROCEDURE — 81003 URINALYSIS AUTO W/O SCOPE: CPT | Performed by: INTERNAL MEDICINE

## 2023-08-25 RX ORDER — SULFAMETHOXAZOLE AND TRIMETHOPRIM 800; 160 MG/1; MG/1
1 TABLET ORAL 2 TIMES DAILY
Qty: 20 TABLET | Refills: 0 | Status: SHIPPED | OUTPATIENT
Start: 2023-08-25 | End: 2023-09-04

## 2023-08-25 NOTE — PROGRESS NOTES
"Sasha Stewart  1990  [id number]  Patient Care Team:  Pino Willis DO as PCP - General (Family Medicine)  Canavan, Allison, MD as Obstetrician (Obstetrics and Gynecology)    Sasha Stewart is a 33 y.o. female here today for follow up.     This patient is accompanied by their self who contributes to the history of their care.    Chief Complaint:    Chief Complaint   Patient presents with    Urinary Frequency    Difficulty Urinating        History of Present Illness:  I have reviewed and/or updated the patient's past medical, past surgical, family, social history, problem list and allergies as appropriate.     Still with burning with urination and frequency. Denies blood in her urine. Denies any discharge itching. She did see gyne this week and pap was OKAY.    Denies fevers or chills. While on antibiotic sx improved but returned 2 days ago  never fully resolved.  Was treated with cefdnir.     Review of Systems   Constitutional: Negative.    Respiratory: Negative.     Genitourinary:  Positive for dysuria, frequency and urgency.   Musculoskeletal: Negative.      Vitals:    08/25/23 1512   BP: 122/78   Pulse: 84   SpO2: 99%   Weight: 118 kg (259 lb 9.6 oz)   Height: 167.6 cm (65.98\")     Body mass index is 41.92 kg/mý.    Physical Exam  Vitals and nursing note reviewed.   Constitutional:       General: She is not in acute distress.     Appearance: She is well-developed. She is not diaphoretic.   HENT:      Head: Normocephalic and atraumatic.      Right Ear: External ear normal.      Left Ear: External ear normal.      Mouth/Throat:      Pharynx: No oropharyngeal exudate.   Eyes:      General: No scleral icterus.        Right eye: No discharge.      Conjunctiva/sclera: Conjunctivae normal.   Neck:      Thyroid: No thyromegaly.      Vascular: No JVD.      Trachea: No tracheal deviation.   Cardiovascular:      Rate and Rhythm: Normal rate and regular rhythm.      Heart sounds: Normal " heart sounds.      Comments: PMI nondisplaced  Pulmonary:      Effort: Pulmonary effort is normal.      Breath sounds: Normal breath sounds. No wheezing or rales.   Abdominal:      General: Bowel sounds are normal.      Palpations: Abdomen is soft.      Tenderness: There is no abdominal tenderness. There is no guarding or rebound.   Musculoskeletal:      Cervical back: Normal range of motion and neck supple.   Lymphadenopathy:      Cervical: No cervical adenopathy.   Skin:     General: Skin is warm and dry.      Capillary Refill: Capillary refill takes less than 2 seconds.      Coloration: Skin is not pale.      Findings: No rash.   Neurological:      Mental Status: She is alert and oriented to person, place, and time.      Motor: No abnormal muscle tone.      Coordination: Coordination normal.   Psychiatric:         Judgment: Judgment normal.       Procedures    Results Review:    I reviewed the patient's new clinical results.      omponent  Ref Range & Units 15:34  (8/25/23)   Color  Yellow, Straw, Dark Yellow, Jody Yellow   Clarity, UA  Clear Clear   Specific Gravity  1.005 - 1.030 1.030   pH, Urine  5.0 - 8.0 5.5   Leukocytes  Negative Negative   Nitrite, UA  Negative Negative   Protein, POC  Negative mg/dL Trace Abnormal    Glucose, UA  Negative mg/dL Negative   Ketones, UA  Negative Negative     Narrative & Impression   US NON-OB TRANSVAGINAL     Date of Exam: 8/23/2023 2:34 PM EDT     Indication: vaginal bleeding, left pelvic pain.     Comparison: No comparisons available.     Technique: Transvaginal pelvic ultrasound was performed.  Grayscale and color Doppler imaging was utilized to evaluate the pelvic area with image documentation per protocol.        Findings:  History indicates vaginal bleeding, left-sided pelvic pain. Prior history of fibroids.     The uterus appears enlarged, approximately 9 x 5 x 5.5 cm in maximal diameter. There are multiple nabothian cysts of the cervix. There is a small amount of  fluid in the endocervical canal. The endometrium is thickened and heterogeneous, measuring 2.2 cm   in maximal width although there is no obvious endometrial mass. There is a small anterior fundal fibroid approximately 1 cm in diameter, rounded and hypoechoic in appearance. It does not appear to be subendometrial in location. No other definite fibroids   are appreciated.     The right ovary measures 2.9 x 2.4 x 2.5 cm and contains multiple follicles, up to 1 cm in diameter. There is expected color Doppler flow. Left ovary is not visible, and is likely obscured due to bowel gas. No other evidence of pathologic adnexal mass,   or intrapelvic free fluid is seen.           IMPRESSION:  Impression:     1. Thickened endometrium to approximately 2.2 cm, greater than the expected upper limit of normal of 15 mm during secretory phase. GYN evaluation is suggested.     2. Grossly normal-appearing right ovary. Left ovary not visualized.     3. 1 cm anterior fundal fibroid.       Assessment/Plan:    Problem List Items Addressed This Visit          Genitourinary and Reproductive     Abnormal ultrasound of endometrium    Overview     She will revisit her gynecologist for biopsy.  Discussed with the patient. phone  Number provided.          Other Visit Diagnoses       Burning with urination    -  Primary    [place] with broader spectrum antibiotics for UTI however if symptomatic going forward would recommend evaluation by urology for possible urethral stenosis    Relevant Orders    POC Urinalysis Dipstick, Automated (Completed)            Plan of care reviewed with patient at the conclusion of today's visit. Education was provided regarding diagnosis and management.  Patient verbalizes understanding of and agreement with management plan.    No follow-ups on file.    Vinicio Jara MD      Please note than portions of this note were completed [facility] a Voice Recognition Program

## 2023-10-30 PROCEDURE — 87086 URINE CULTURE/COLONY COUNT: CPT | Performed by: NURSE PRACTITIONER

## 2023-11-07 ENCOUNTER — OFFICE VISIT (OUTPATIENT)
Dept: FAMILY MEDICINE CLINIC | Facility: CLINIC | Age: 33
End: 2023-11-07
Payer: COMMERCIAL

## 2023-11-07 ENCOUNTER — LAB (OUTPATIENT)
Dept: LAB | Facility: HOSPITAL | Age: 33
End: 2023-11-07
Payer: COMMERCIAL

## 2023-11-07 VITALS
WEIGHT: 250.8 LBS | HEIGHT: 66 IN | SYSTOLIC BLOOD PRESSURE: 118 MMHG | BODY MASS INDEX: 40.31 KG/M2 | DIASTOLIC BLOOD PRESSURE: 78 MMHG

## 2023-11-07 DIAGNOSIS — R51.9 ACUTE NONINTRACTABLE HEADACHE, UNSPECIFIED HEADACHE TYPE: ICD-10-CM

## 2023-11-07 DIAGNOSIS — R74.8 ELEVATED LIVER ENZYMES: Primary | ICD-10-CM

## 2023-11-07 DIAGNOSIS — E55.9 VITAMIN D DEFICIENCY: ICD-10-CM

## 2023-11-07 DIAGNOSIS — F33.41 RECURRENT MAJOR DEPRESSIVE DISORDER, IN PARTIAL REMISSION: ICD-10-CM

## 2023-11-07 DIAGNOSIS — N92.6 IRREGULAR MENSTRUAL CYCLE: ICD-10-CM

## 2023-11-07 DIAGNOSIS — E53.8 FOLATE DEFICIENCY: ICD-10-CM

## 2023-11-07 DIAGNOSIS — D50.9 IRON DEFICIENCY ANEMIA, UNSPECIFIED IRON DEFICIENCY ANEMIA TYPE: ICD-10-CM

## 2023-11-07 LAB
25(OH)D3 SERPL-MCNC: 15.3 NG/ML (ref 30–100)
DEPRECATED RDW RBC AUTO: 39.3 FL (ref 37–54)
ERYTHROCYTE [DISTWIDTH] IN BLOOD BY AUTOMATED COUNT: 15.1 % (ref 12.3–15.4)
HAV IGM SERPL QL IA: NORMAL
HBV CORE IGM SERPL QL IA: NORMAL
HBV SURFACE AG SERPL QL IA: NORMAL
HCG INTACT+B SERPL-ACNC: <1 MIU/ML
HCT VFR BLD AUTO: 30.4 % (ref 34–46.6)
HCV AB SER DONR QL: NORMAL
HGB BLD-MCNC: 9.2 G/DL (ref 12–15.9)
MCH RBC QN AUTO: 22.1 PG (ref 26.6–33)
MCHC RBC AUTO-ENTMCNC: 30.3 G/DL (ref 31.5–35.7)
MCV RBC AUTO: 73.1 FL (ref 79–97)
NRBC BLD AUTO-RTO: 0 /100 WBC (ref 0–0.2)
PLATELET # BLD AUTO: 481 10*3/MM3 (ref 140–450)
PMV BLD AUTO: 11.5 FL (ref 6–12)
RBC # BLD AUTO: 4.16 10*6/MM3 (ref 3.77–5.28)
VIT B12 BLD-MCNC: 367 PG/ML (ref 211–946)
WBC NRBC COR # BLD: 6.73 10*3/MM3 (ref 3.4–10.8)

## 2023-11-07 PROCEDURE — 83540 ASSAY OF IRON: CPT | Performed by: FAMILY MEDICINE

## 2023-11-07 PROCEDURE — 82746 ASSAY OF FOLIC ACID SERUM: CPT | Performed by: PHYSICIAN ASSISTANT

## 2023-11-07 PROCEDURE — 80053 COMPREHEN METABOLIC PANEL: CPT | Performed by: FAMILY MEDICINE

## 2023-11-07 PROCEDURE — 84466 ASSAY OF TRANSFERRIN: CPT | Performed by: FAMILY MEDICINE

## 2023-11-07 PROCEDURE — 36415 COLL VENOUS BLD VENIPUNCTURE: CPT | Performed by: FAMILY MEDICINE

## 2023-11-07 PROCEDURE — 83735 ASSAY OF MAGNESIUM: CPT | Performed by: FAMILY MEDICINE

## 2023-11-07 PROCEDURE — 82607 VITAMIN B-12: CPT | Performed by: PHYSICIAN ASSISTANT

## 2023-11-07 PROCEDURE — 85025 COMPLETE CBC W/AUTO DIFF WBC: CPT | Performed by: FAMILY MEDICINE

## 2023-11-07 PROCEDURE — 82728 ASSAY OF FERRITIN: CPT | Performed by: FAMILY MEDICINE

## 2023-11-07 PROCEDURE — 82306 VITAMIN D 25 HYDROXY: CPT | Performed by: PHYSICIAN ASSISTANT

## 2023-11-07 PROCEDURE — 99214 OFFICE O/P EST MOD 30 MIN: CPT | Performed by: FAMILY MEDICINE

## 2023-11-07 PROCEDURE — 80074 ACUTE HEPATITIS PANEL: CPT | Performed by: FAMILY MEDICINE

## 2023-11-07 PROCEDURE — 84702 CHORIONIC GONADOTROPIN TEST: CPT | Performed by: PHYSICIAN ASSISTANT

## 2023-11-07 PROCEDURE — 85007 BL SMEAR W/DIFF WBC COUNT: CPT | Performed by: FAMILY MEDICINE

## 2023-11-07 RX ORDER — BUSPIRONE HYDROCHLORIDE 10 MG/1
10 TABLET ORAL 2 TIMES DAILY
Qty: 60 TABLET | Refills: 11 | Status: SHIPPED | OUTPATIENT
Start: 2023-11-07

## 2023-11-07 NOTE — PROGRESS NOTES
Established Patient Office Visit      Patient Name: Sasha Stewart  : 1990   MRN: [id number]   Care Team: Patient Care Team:  Pino Willis DO as PCP - General (Family Medicine)  Canavan, Allison, MD as Obstetrician (Obstetrics and Gynecology)    Chief Complaint:    Chief Complaint   Patient presents with    Labs Only     Pt wants to discuss prior lab work and complains about daily headaches    Headache       History of Present Illness: Sasha Stewart is a 33 y.o. female who is here today for chief complaint.    HPI    Previous heavy alcohol use 5 shots per day, stopped around beginning of .    This patient is accompanied by their self who contributes to the history of their care.    The following portions of the patient's history were reviewed and updated as appropriate: allergies, current medications, past family history, past medical history, past social history, past surgical history and problem list.    Subjective      Review of Systems:   Review of Systems - See HPI    Past Medical History:   Past Medical History:   Diagnosis Date    Anemia     Anxiety     Asthma     Depression     Fibroid     GERD (gastroesophageal reflux disease)     PID (pelvic inflammatory disease)     Seasonal allergies        Past Surgical History:   Past Surgical History:   Procedure Laterality Date    BARIATRIC SURGERY       SECTION      ,      SECTION WITH TUBAL Bilateral 2016    Procedure:  SECTION REPEAT WITH TUBAL;  Surgeon: Chrissy Hampton MD;  Location: [facility] LABOR DELIVERY;  Service:     GASTRIC SLEEVE LAPAROSCOPIC      TUBAL ABDOMINAL LIGATION      WISDOM TOOTH EXTRACTION         Family History:   Family History   Problem Relation Age of Onset    Diabetes Mother     Hypertension Mother     Arthritis Mother     Obesity Mother     Hypertension Father     Coronary artery disease Paternal Grandfather     Stroke Paternal Grandfather     Hypertension  Maternal Grandmother     Arthritis Sister     Obesity Sister     Migraines Sister     Obesity Brother     Arthritis Maternal Grandfather        Social History:   Social History     Socioeconomic History    Marital status:    Tobacco Use    Smoking status: Never     Passive exposure: Never    Smokeless tobacco: Never   Vaping Use    Vaping Use: Never used   Substance and Sexual Activity    Alcohol use: No    Drug use: No    Sexual activity: Yes     Partners: Male     Birth control/protection: Tubal ligation       Tobacco History:   Social History     Tobacco Use   Smoking Status Never    Passive exposure: Never   Smokeless Tobacco Never       Medications:     Current Outpatient Medications:     albuterol sulfate  (90 Base) MCG/ACT inhaler, Inhale 2 puffs Every 4 (Four) Hours As Needed for Wheezing., Disp: 18 g, Rfl: 0    busPIRone (BUSPAR) 10 MG tablet, Take 1 tablet by mouth 2 (Two) Times a Day., Disp: 60 tablet, Rfl: 11    cetirizine (zyrTEC) 10 MG tablet, Take 1 tablet by mouth Daily., Disp: 30 tablet, Rfl: 0    famotidine (Pepcid) 20 MG tablet, Take 1 tablet by mouth 2 (Two) Times a Day., Disp: 60 tablet, Rfl: 1    fluticasone (Flonase) 50 MCG/ACT nasal spray, 2 sprays into the nostril(s) as directed by provider Daily. (Patient taking differently: 2 sprays into the nostril(s) as directed by provider As Needed.), Disp: 18.2 mL, Rfl: 2    folic acid (FOLVITE) 1 MG tablet, Take 1 tablet by mouth Daily., Disp: 90 tablet, Rfl: 1    ibuprofen (ADVIL,MOTRIN) 800 MG tablet, Take 1 tablet by mouth Every 8 (Eight) Hours As Needed for Mild Pain., Disp: 90 tablet, Rfl: 0    norethindrone (MICRONOR) 0.35 MG tablet, Take 1 tablet by mouth Daily., Disp: , Rfl:     vitamin B-12 (CYANOCOBALAMIN) 1000 MCG tablet, Take 1 tablet by mouth Daily., Disp: 90 tablet, Rfl: 1    buPROPion SR (WELLBUTRIN SR) 150 MG 12 hr tablet, Take 1 tablet by mouth twice daily, Disp: 60 tablet, Rfl: 0    Allergies:   No Known  "Allergies    Objective   Objective     Physical Exam:  Vital Signs:   Vitals:    11/07/23 1602   BP: 118/78   BP Location: Left arm   Patient Position: Sitting   Cuff Size: Adult   Weight: 114 kg (250 lb 12.8 oz)   Height: 167.6 cm (66\")     Body mass index is 40.48 kg/m².     Physical Exam  Nursing note reviewed  Const: NAD, A&Ox4, Pleasant, Cooperative  Eyes: EOMI, no conjunctivitis  ENT: No nasal discharge present, neck supple  Cardiac: Regular rate and rhythm, no cyanosis  Resp: Respiratory rate within normal limits, no increased work of breathing, no audible wheezing or retractions noted  GI: No distention or ascites  MSK: Motor and sensation grossly intact in bilateral upper extremities  Neurologic: CN II-XII grossly intact  Psych: Appropriate mood and behavior.  Skin: Warm, dry  Procedures/Radiology     Procedures  No radiology results for the last 7 days     Assessment & Plan   Assessment / Plan      Assessment/Plan:   Problems Addressed This Visit  Diagnoses and all orders for this visit:    1. Elevated liver enzymes (Primary)  -     Comprehensive Metabolic Panel; Future  -     Hepatitis C antibody; Future  -     Magnesium; Future  -     Hepatitis panel, acute; Future  -     Comprehensive Metabolic Panel  -     Cancel: Hepatitis C antibody  -     Magnesium  -     Hepatitis panel, acute    2. Recurrent major depressive disorder, in partial remission  -     busPIRone (BUSPAR) 10 MG tablet; Take 1 tablet by mouth 2 (Two) Times a Day.  Dispense: 60 tablet; Refill: 11    3. Acute nonintractable headache, unspecified headache type    4. Vitamin D deficiency  -     Vitamin D,25-Hydroxy; Future  -     Vitamin D,25-Hydroxy    5. Iron deficiency anemia, unspecified iron deficiency anemia type  -     CBC & Differential; Future  -     Iron Profile; Future  -     Ferritin; Future  -     CBC & Differential  -     Iron Profile  -     Ferritin  -     Cancel: Manual Differential  -     Manual Differential    6. Folate " deficiency  -     Folate; Future  -     Vitamin B12; Future  -     Folate  -     Vitamin B12    7. Irregular menstrual cycle  -     hCG, Quantitative, Pregnancy      Problem List Items Addressed This Visit          Endocrine and Metabolic    Vitamin D deficiency    Relevant Orders    Vitamin D,25-Hydroxy (Completed)       Genitourinary and Reproductive     Irregular menstrual cycle       Hematology and Neoplasia    Iron deficiency anemia    Relevant Orders    CBC & Differential (Completed)    Iron Profile (Completed)    Ferritin (Completed)    Manual Differential (Completed)       Mental Health    Depression    Relevant Medications    busPIRone (BUSPAR) 10 MG tablet     Other Visit Diagnoses       Elevated liver enzymes    -  Primary    Relevant Orders    Comprehensive Metabolic Panel (Completed)    Hepatitis C antibody    Magnesium (Completed)    Hepatitis panel, acute (Completed)    Acute nonintractable headache, unspecified headache type        Folate deficiency        Relevant Orders    Folate (Completed)    Vitamin B12 (Completed)              There are no Patient Instructions on file for this visit.    Follow Up:   No follow-ups on file.        DO MANJINDER Benjamin RD  [facility]  [address]  [facility] [address]  Fax [phone]  Phone [phone]

## 2023-11-08 DIAGNOSIS — F33.41 RECURRENT MAJOR DEPRESSIVE DISORDER, IN PARTIAL REMISSION: ICD-10-CM

## 2023-11-08 LAB
ALBUMIN SERPL-MCNC: 4.1 G/DL (ref 3.5–5.2)
ALBUMIN/GLOB SERPL: 1.3 G/DL
ALP SERPL-CCNC: 95 U/L (ref 39–117)
ALT SERPL W P-5'-P-CCNC: 19 U/L (ref 1–33)
ANION GAP SERPL CALCULATED.3IONS-SCNC: 11.7 MMOL/L (ref 5–15)
ANISOCYTOSIS BLD QL: ABNORMAL
AST SERPL-CCNC: 16 U/L (ref 1–32)
BILIRUB SERPL-MCNC: <0.2 MG/DL (ref 0–1.2)
BUN SERPL-MCNC: 12 MG/DL (ref 6–20)
BUN/CREAT SERPL: 16.4 (ref 7–25)
CALCIUM SPEC-SCNC: 9.5 MG/DL (ref 8.6–10.5)
CHLORIDE SERPL-SCNC: 105 MMOL/L (ref 98–107)
CO2 SERPL-SCNC: 22.3 MMOL/L (ref 22–29)
CREAT SERPL-MCNC: 0.73 MG/DL (ref 0.57–1)
EGFRCR SERPLBLD CKD-EPI 2021: 111.5 ML/MIN/1.73
ELLIPTOCYTES BLD QL SMEAR: ABNORMAL
EOSINOPHIL # BLD MANUAL: 0.13 10*3/MM3 (ref 0–0.4)
EOSINOPHIL NFR BLD MANUAL: 2 % (ref 0.3–6.2)
FERRITIN SERPL-MCNC: 11.2 NG/ML (ref 13–150)
FOLATE SERPL-MCNC: 5.53 NG/ML (ref 4.78–24.2)
GLOBULIN UR ELPH-MCNC: 3.2 GM/DL
GLUCOSE SERPL-MCNC: 98 MG/DL (ref 65–99)
IRON 24H UR-MRATE: 21 MCG/DL (ref 37–145)
IRON SATN MFR SERPL: 4 % (ref 20–50)
LYMPHOCYTES # BLD MANUAL: 1.01 10*3/MM3 (ref 0.7–3.1)
LYMPHOCYTES NFR BLD MANUAL: 2 % (ref 5–12)
MAGNESIUM SERPL-MCNC: 2.1 MG/DL (ref 1.6–2.6)
MICROCYTES BLD QL: ABNORMAL
MONOCYTES # BLD: 0.13 10*3/MM3 (ref 0.1–0.9)
NEUTROPHILS # BLD AUTO: 5.45 10*3/MM3 (ref 1.7–7)
NEUTROPHILS NFR BLD MANUAL: 81 % (ref 42.7–76)
OVALOCYTES BLD QL SMEAR: ABNORMAL
POIKILOCYTOSIS BLD QL SMEAR: ABNORMAL
POTASSIUM SERPL-SCNC: 3.7 MMOL/L (ref 3.5–5.2)
PROT SERPL-MCNC: 7.3 G/DL (ref 6–8.5)
SMALL PLATELETS BLD QL SMEAR: ADEQUATE
SODIUM SERPL-SCNC: 139 MMOL/L (ref 136–145)
SPHEROCYTES BLD QL SMEAR: ABNORMAL
TARGETS BLD QL SMEAR: ABNORMAL
TIBC SERPL-MCNC: 472 MCG/DL (ref 298–536)
TRANSFERRIN SERPL-MCNC: 317 MG/DL (ref 200–360)
VARIANT LYMPHS NFR BLD MANUAL: 15 % (ref 19.6–45.3)
WBC MORPH BLD: NORMAL

## 2023-11-09 RX ORDER — BUPROPION HYDROCHLORIDE 150 MG/1
150 TABLET, EXTENDED RELEASE ORAL 2 TIMES DAILY
Qty: 60 TABLET | Refills: 0 | Status: SHIPPED | OUTPATIENT
Start: 2023-11-09

## 2023-11-27 ENCOUNTER — TELEPHONE (OUTPATIENT)
Dept: FAMILY MEDICINE CLINIC | Facility: CLINIC | Age: 33
End: 2023-11-27
Payer: COMMERCIAL

## 2023-11-27 DIAGNOSIS — D50.9 IRON DEFICIENCY ANEMIA, UNSPECIFIED IRON DEFICIENCY ANEMIA TYPE: Primary | ICD-10-CM

## 2023-11-27 DIAGNOSIS — E55.9 VITAMIN D DEFICIENCY: ICD-10-CM

## 2023-11-27 NOTE — TELEPHONE ENCOUNTER
Caller: Mary Dumont    Relationship: Self    Best call back number: [phone]     Caller requesting test results: MARY DUMONT     What test was performed: BLOOD WORK     When was the test performed: 11/7/23     Where was the test performed: IN OFFICE     Additional notes: PLEASE CALL PATIENT BACK WITH THE RESULTS.

## 2023-11-30 RX ORDER — FERROUS GLUCONATE 324(38)MG
324 TABLET ORAL
Qty: 90 TABLET | Refills: 0 | Status: SHIPPED | OUTPATIENT
Start: 2023-11-30

## 2023-11-30 RX ORDER — ERGOCALCIFEROL 1.25 MG/1
50000 CAPSULE ORAL WEEKLY
Qty: 12 CAPSULE | Refills: 0 | Status: SHIPPED | OUTPATIENT
Start: 2023-11-30 | End: 2024-02-16

## 2023-11-30 NOTE — TELEPHONE ENCOUNTER
Patient called in again today inquiring about her labs from 11/7/2023.   There was a message from Noris ZAMARRIPA about the HCG lab that the patient read on [employer], however she did not see results for the rest of what she completed.

## 2023-11-30 NOTE — TELEPHONE ENCOUNTER
Patient informed of lab results and recommendations for supplements. She is concerned with the manual differential and what shows abnormal here. She wants to know if this means she has an infection?  She mentioned having a UTI for 3 months however I did tell her during her hospital visit from 10/30 the hospitalist did run a urine culture that was normal but without a current urinalysis we can't tell if that has changed from then.

## 2023-12-01 NOTE — TELEPHONE ENCOUNTER
No, the percentage neutrophils can suggest infection but mostly in context of elevated WBC count (which was normal for her). Her liver enzymes returned to normal.

## 2023-12-04 DIAGNOSIS — F33.41 RECURRENT MAJOR DEPRESSIVE DISORDER, IN PARTIAL REMISSION: ICD-10-CM

## 2023-12-05 RX ORDER — BUPROPION HYDROCHLORIDE 150 MG/1
150 TABLET, EXTENDED RELEASE ORAL 2 TIMES DAILY
Qty: 60 TABLET | Refills: 0 | Status: SHIPPED | OUTPATIENT
Start: 2023-12-05

## 2024-08-15 DIAGNOSIS — F33.41 RECURRENT MAJOR DEPRESSIVE DISORDER, IN PARTIAL REMISSION: ICD-10-CM

## 2024-08-15 NOTE — TELEPHONE ENCOUNTER
Caller: Sasha Stewart    Relationship: Self    Best call back number: [phone]     Requested Prescriptions:   Requested Prescriptions     Pending Prescriptions Disp Refills    busPIRone (BUSPAR) 10 MG tablet 60 tablet 11     Sig: Take 1 tablet by mouth 2 (Two) Times a Day.    buPROPion SR (WELLBUTRIN SR) 150 MG 12 hr tablet 60 tablet 0     Sig: Take 1 tablet by mouth 2 (Two) Times a Day.        Pharmacy where request should be sent: [address] [phone] [place] [phone] FX     Last office visit with prescribing clinician: 11/7/2023   Last telemedicine visit with prescribing clinician: Visit date not found   Next office visit with prescribing clinician: Visit date not found     Additional details provided by patient: NO MEDICATION ON HAND    Does the patient have less than a 3 day supply:  [x] Yes  [] No    Would you like a call back once the refill request has been completed: [] Yes [] No    If the office needs to give you a call back, can they leave a voicemail: [] Yes [] No    Su Dinh Rep   08/15/24 11:17 EDT

## 2024-08-19 NOTE — TELEPHONE ENCOUNTER
PATIENT CALLED IN I INFORMED PATIENT THAT SHE NEEDS AN APPOINTMENT PATIENT STATES THAT SHE CAN'T MAKE APPOINTMENT RIGHT NOW SHE CAN'T GET OFF WORK BUT SHE IS OUT OF THE MEDICATION AND WOULD LIKE THAT TO BE REFILLED

## 2024-08-20 RX ORDER — BUSPIRONE HYDROCHLORIDE 10 MG/1
10 TABLET ORAL 2 TIMES DAILY
Qty: 60 TABLET | Refills: 11 | Status: SHIPPED | OUTPATIENT
Start: 2024-08-20

## 2024-08-20 RX ORDER — BUPROPION HYDROCHLORIDE 150 MG/1
150 TABLET, EXTENDED RELEASE ORAL 2 TIMES DAILY
Qty: 60 TABLET | Refills: 0 | Status: SHIPPED | OUTPATIENT
Start: 2024-08-20

## 2024-11-06 DIAGNOSIS — F33.41 RECURRENT MAJOR DEPRESSIVE DISORDER, IN PARTIAL REMISSION: ICD-10-CM

## 2024-11-06 NOTE — TELEPHONE ENCOUNTER
Caller: Sasha Stewart    Relationship: Self    Best call back number: [phone]    Requested Prescriptions:   Requested Prescriptions     Pending Prescriptions Disp Refills    buPROPion SR (WELLBUTRIN SR) 150 MG 12 hr tablet 60 tablet 0     Sig: Take 1 tablet by mouth 2 (Two) Times a Day.        Pharmacy where request should be sent:    [facility] [phone]  Last office visit with prescribing clinician: 11/7/2023   Last telemedicine visit with prescribing clinician: Visit date not found   Next office visit with prescribing clinician: Visit date not found     Additional details provided by patient: PATIENT HAS 3 DAYS LEFT OF MEDICATION    Does the patient have less than a 3 day supply:  [] Yes  [x] No    Would you like a call back once the refill request has been completed: [] Yes [x] No    If the office needs to give you a call back, can they leave a voicemail: [] Yes [x] No    Su Diaz Rep   11/06/24 09:28 EST

## 2024-11-08 RX ORDER — BUPROPION HYDROCHLORIDE 150 MG/1
150 TABLET, EXTENDED RELEASE ORAL 2 TIMES DAILY
Qty: 60 TABLET | Refills: 0 | Status: SHIPPED | OUTPATIENT
Start: 2024-11-08

## 2025-01-24 ENCOUNTER — OFFICE VISIT (OUTPATIENT)
Dept: FAMILY MEDICINE CLINIC | Facility: CLINIC | Age: 35
End: 2025-01-24
Payer: COMMERCIAL

## 2025-01-24 ENCOUNTER — LAB (OUTPATIENT)
Dept: LAB | Facility: HOSPITAL | Age: 35
End: 2025-01-24
Payer: COMMERCIAL

## 2025-01-24 VITALS
BODY MASS INDEX: 45.48 KG/M2 | SYSTOLIC BLOOD PRESSURE: 128 MMHG | OXYGEN SATURATION: 98 % | HEIGHT: 65 IN | WEIGHT: 273 LBS | HEART RATE: 96 BPM | DIASTOLIC BLOOD PRESSURE: 82 MMHG

## 2025-01-24 DIAGNOSIS — R10.11 RUQ ABDOMINAL PAIN: ICD-10-CM

## 2025-01-24 DIAGNOSIS — M79.89 LEG SWELLING: ICD-10-CM

## 2025-01-24 DIAGNOSIS — R74.8 ELEVATED LIVER ENZYMES: ICD-10-CM

## 2025-01-24 DIAGNOSIS — R10.11 RUQ ABDOMINAL PAIN: Primary | ICD-10-CM

## 2025-01-24 DIAGNOSIS — F33.41 RECURRENT MAJOR DEPRESSIVE DISORDER, IN PARTIAL REMISSION: ICD-10-CM

## 2025-01-24 DIAGNOSIS — N92.6 IRREGULAR MENSTRUAL CYCLE: ICD-10-CM

## 2025-01-24 LAB
ALBUMIN SERPL-MCNC: 4.1 G/DL (ref 3.5–5.2)
ALBUMIN/GLOB SERPL: 1.1 G/DL
ALP SERPL-CCNC: 108 U/L (ref 39–117)
ALT SERPL W P-5'-P-CCNC: 23 U/L (ref 1–33)
ANION GAP SERPL CALCULATED.3IONS-SCNC: 15 MMOL/L (ref 5–15)
AST SERPL-CCNC: 23 U/L (ref 1–32)
BASOPHILS # BLD AUTO: 0.06 10*3/MM3 (ref 0–0.2)
BASOPHILS NFR BLD AUTO: 0.8 % (ref 0–1.5)
BILIRUB SERPL-MCNC: <0.2 MG/DL (ref 0–1.2)
BUN SERPL-MCNC: 10 MG/DL (ref 6–20)
BUN/CREAT SERPL: 13.5 (ref 7–25)
CALCIUM SPEC-SCNC: 9.4 MG/DL (ref 8.6–10.5)
CERULOPLASMIN SERPL-MCNC: 35 MG/DL (ref 19–39)
CHLORIDE SERPL-SCNC: 103 MMOL/L (ref 98–107)
CO2 SERPL-SCNC: 21 MMOL/L (ref 22–29)
CREAT SERPL-MCNC: 0.74 MG/DL (ref 0.57–1)
D DIMER PPP FEU-MCNC: <0.27 MCGFEU/ML (ref 0–0.5)
DEPRECATED RDW RBC AUTO: 47.4 FL (ref 37–54)
EGFRCR SERPLBLD CKD-EPI 2021: 109 ML/MIN/1.73
EOSINOPHIL # BLD AUTO: 0.12 10*3/MM3 (ref 0–0.4)
EOSINOPHIL NFR BLD AUTO: 1.6 % (ref 0.3–6.2)
ERYTHROCYTE [DISTWIDTH] IN BLOOD BY AUTOMATED COUNT: 16 % (ref 12.3–15.4)
FERRITIN SERPL-MCNC: 25.8 NG/ML (ref 13–150)
GLOBULIN UR ELPH-MCNC: 3.6 GM/DL
GLUCOSE SERPL-MCNC: 95 MG/DL (ref 65–99)
HCT VFR BLD AUTO: 36.5 % (ref 34–46.6)
HGB BLD-MCNC: 11.2 G/DL (ref 12–15.9)
IMM GRANULOCYTES # BLD AUTO: 0.03 10*3/MM3 (ref 0–0.05)
IMM GRANULOCYTES NFR BLD AUTO: 0.4 % (ref 0–0.5)
LYMPHOCYTES # BLD AUTO: 1.19 10*3/MM3 (ref 0.7–3.1)
LYMPHOCYTES NFR BLD AUTO: 15.5 % (ref 19.6–45.3)
MCH RBC QN AUTO: 25.1 PG (ref 26.6–33)
MCHC RBC AUTO-ENTMCNC: 30.7 G/DL (ref 31.5–35.7)
MCV RBC AUTO: 81.8 FL (ref 79–97)
MONOCYTES # BLD AUTO: 0.59 10*3/MM3 (ref 0.1–0.9)
MONOCYTES NFR BLD AUTO: 7.7 % (ref 5–12)
NEUTROPHILS NFR BLD AUTO: 5.69 10*3/MM3 (ref 1.7–7)
NEUTROPHILS NFR BLD AUTO: 74 % (ref 42.7–76)
NRBC BLD AUTO-RTO: 0 /100 WBC (ref 0–0.2)
NT-PROBNP SERPL-MCNC: <36 PG/ML (ref 0–450)
PLATELET # BLD AUTO: 466 10*3/MM3 (ref 140–450)
PMV BLD AUTO: 11.3 FL (ref 6–12)
POTASSIUM SERPL-SCNC: 4 MMOL/L (ref 3.5–5.2)
PROT SERPL-MCNC: 7.7 G/DL (ref 6–8.5)
RBC # BLD AUTO: 4.46 10*6/MM3 (ref 3.77–5.28)
SODIUM SERPL-SCNC: 139 MMOL/L (ref 136–145)
TSH SERPL DL<=0.05 MIU/L-ACNC: 1.63 UIU/ML (ref 0.27–4.2)
WBC NRBC COR # BLD AUTO: 7.68 10*3/MM3 (ref 3.4–10.8)

## 2025-01-24 PROCEDURE — 82172 ASSAY OF APOLIPOPROTEIN: CPT

## 2025-01-24 PROCEDURE — 80050 GENERAL HEALTH PANEL: CPT

## 2025-01-24 PROCEDURE — 83883 ASSAY NEPHELOMETRY NOT SPEC: CPT

## 2025-01-24 PROCEDURE — 85379 FIBRIN DEGRADATION QUANT: CPT

## 2025-01-24 PROCEDURE — 86038 ANTINUCLEAR ANTIBODIES: CPT

## 2025-01-24 PROCEDURE — 84460 ALANINE AMINO (ALT) (SGPT): CPT

## 2025-01-24 PROCEDURE — 82977 ASSAY OF GGT: CPT

## 2025-01-24 PROCEDURE — 84450 TRANSFERASE (AST) (SGOT): CPT

## 2025-01-24 PROCEDURE — 82247 BILIRUBIN TOTAL: CPT

## 2025-01-24 PROCEDURE — 82465 ASSAY BLD/SERUM CHOLESTEROL: CPT

## 2025-01-24 PROCEDURE — 84478 ASSAY OF TRIGLYCERIDES: CPT

## 2025-01-24 PROCEDURE — 83880 ASSAY OF NATRIURETIC PEPTIDE: CPT

## 2025-01-24 PROCEDURE — 82390 ASSAY OF CERULOPLASMIN: CPT

## 2025-01-24 PROCEDURE — 82947 ASSAY GLUCOSE BLOOD QUANT: CPT

## 2025-01-24 PROCEDURE — 82728 ASSAY OF FERRITIN: CPT

## 2025-01-24 PROCEDURE — 83010 ASSAY OF HAPTOGLOBIN QUANT: CPT

## 2025-01-24 RX ORDER — BUPROPION HYDROCHLORIDE 150 MG/1
150 TABLET, EXTENDED RELEASE ORAL 2 TIMES DAILY
Qty: 180 TABLET | Refills: 1 | Status: SHIPPED | OUTPATIENT
Start: 2025-01-24

## 2025-01-24 RX ORDER — ACETAMINOPHEN AND CODEINE PHOSPHATE 120; 12 MG/5ML; MG/5ML
1 SOLUTION ORAL DAILY
Qty: 28 TABLET | Refills: 2 | Status: SHIPPED | OUTPATIENT
Start: 2025-01-24

## 2025-01-24 NOTE — PROGRESS NOTES
Established Patient Office Visit      Patient Name: Sasha Stewart  : 1990   MRN: [id number]   Care Team: Patient Care Team:  Pino Willis DO as PCP - General (Family Medicine)  Canavan, Allison, MD as Obstetrician (Obstetrics and Gynecology)    Chief Complaint:    Chief Complaint   Patient presents with    Joint Swelling     Legs and arms. The leg swelling comes and goes, and the arms began swelling a couple days ago.    Abdominal Pain     Off and on for a few months now. Around her liver    Fatigue    Nausea       History of Present Illness: Sasha Stewart is a 34 y.o. female who is here today for chief complaint.    HPI    Started having swelling in both arms the last 2 days (it was at its worst 2 days ago, non-pitting). Has chronic leg swelling, dependent edema. Alcohol has been at zero since ., was heavier last year 4-5 drinks per week.    Has been taking oron gummies at home OTC.     RUQ pain since November. Returns every few days.    This patient is accompanied by their self who contributes to the history of their care.    The following portions of the patient's history were reviewed and updated as appropriate: allergies, current medications, past family history, past medical history, past social history, past surgical history and problem list.    Subjective      Review of Systems:   Review of Systems - See HPI    Past Medical History:   Past Medical History:   Diagnosis Date    Anemia     Anxiety     Asthma     Depression     Fibroid     GERD (gastroesophageal reflux disease)     PID (pelvic inflammatory disease)     Seasonal allergies        Past Surgical History:   Past Surgical History:   Procedure Laterality Date    BARIATRIC SURGERY       SECTION      ,      SECTION WITH TUBAL Bilateral 2016    Procedure:  SECTION REPEAT WITH TUBAL;  Surgeon: Chrissy Hampton MD;  Location: [facility] LABOR DELIVERY;  Service:     GASTRIC SLEEVE  LAPAROSCOPIC      TUBAL ABDOMINAL LIGATION      WISDOM TOOTH EXTRACTION         Family History:   Family History   Problem Relation Age of Onset    Diabetes Mother     Hypertension Mother     Arthritis Mother     Obesity Mother     Hypertension Father     Coronary artery disease Paternal Grandfather     Stroke Paternal Grandfather     Hypertension Maternal Grandmother     Arthritis Sister     Obesity Sister     Migraines Sister     Obesity Brother     Arthritis Maternal Grandfather        Social History:   Social History     Socioeconomic History    Marital status:    Tobacco Use    Smoking status: Never     Passive exposure: Never    Smokeless tobacco: Never   Vaping Use    Vaping status: Never Used   Substance and Sexual Activity    Alcohol use: No    Drug use: No    Sexual activity: Yes     Partners: Male     Birth control/protection: Tubal ligation       Tobacco History:   Social History     Tobacco Use   Smoking Status Never    Passive exposure: Never   Smokeless Tobacco Never       Medications:   Outpatient Medications Prior to Visit   Medication Sig Dispense Refill    albuterol sulfate  (90 Base) MCG/ACT inhaler Inhale 2 puffs Every 4 (Four) Hours As Needed for Wheezing or Shortness of Air. 18 g 0    busPIRone (BUSPAR) 10 MG tablet Take 1 tablet by mouth 2 (Two) Times a Day. 60 tablet 11    cetirizine (zyrTEC) 10 MG tablet Take 1 tablet by mouth Daily. 30 tablet 0    ibuprofen (ADVIL,MOTRIN) 800 MG tablet Take 1 tablet by mouth Every 8 (Eight) Hours As Needed for Mild Pain. 90 tablet 0    buPROPion SR (WELLBUTRIN SR) 150 MG 12 hr tablet Take 1 tablet by mouth 2 (Two) Times a Day. 60 tablet 0    ferrous gluconate (FERGON) 324 MG tablet Take 1 tablet by mouth Daily With Breakfast. (Patient not taking: Reported on 1/24/2025) 90 tablet 0    fluticasone (Flonase) 50 MCG/ACT nasal spray 2 sprays into the nostril(s) as directed by provider Daily. (Patient not taking: Reported on 1/24/2025) 18.2 mL 2     "norethindrone (MICRONOR) 0.35 MG tablet Take 1 tablet by mouth Daily. (Patient not taking: Reported on 1/24/2025)       No facility-administered medications prior to visit.        Allergies:   No Known Allergies    Objective   Objective     Physical Exam:  Vital Signs:   Vitals:    01/24/25 1358   BP: 128/82   Pulse: 96   SpO2: 98%   Weight: 124 kg (273 lb)   Height: 165.1 cm (65\")     Body mass index is 45.43 kg/m².     Physical Exam  Nursing note reviewed  Const: NAD, A&Ox4, Pleasant, Cooperative  Eyes: EOMI, no conjunctivitis  ENT: No nasal discharge present, neck supple  Cardiac: Regular rate and rhythm, no cyanosis  Resp: Respiratory rate within normal limits, no increased work of breathing, no audible wheezing or retractions noted  GI: No distention or ascites  MSK: Motor and sensation grossly intact in bilateral upper extremities  Neurologic: CN II-XII grossly intact  Psych: Appropriate mood and behavior.  Skin: Warm, dry  Procedures/Radiology     Procedures  No radiology results for the last 7 days     Assessment & Plan   Assessment / Plan      Assessment/Plan:   Problems Addressed This Visit  Diagnoses and all orders for this visit:    1. RUQ abdominal pain (Primary)  -     D-dimer, Quantitative; Future  -     proBNP; Future  -     Comprehensive Metabolic Panel; Future  -     Ferritin; Future  -     Ceruloplasmin; Future  -     FELICITY; Future  -     KIM Fibrosure Plus; Future  -     CBC & Differential; Future  -     TSH Rfx On Abnormal To Free T4; Future    2. Irregular menstrual cycle  -     norethindrone (MICRONOR) 0.35 MG tablet; Take 1 tablet by mouth Daily.  Dispense: 28 tablet; Refill: 2    3. Recurrent major depressive disorder, in partial remission  -     buPROPion SR (WELLBUTRIN SR) 150 MG 12 hr tablet; Take 1 tablet by mouth 2 (Two) Times a Day.  Dispense: 180 tablet; Refill: 1    4. Elevated liver enzymes  -     D-dimer, Quantitative; Future  -     proBNP; Future  -     Comprehensive Metabolic " Panel; Future  -     Ferritin; Future  -     Ceruloplasmin; Future  -     FELICITY; Future  -     KIM Fibrosure Plus; Future  -     CBC & Differential; Future  -     TSH Rfx On Abnormal To Free T4; Future    5. Leg swelling  -     D-dimer, Quantitative; Future  -     proBNP; Future  -     Comprehensive Metabolic Panel; Future  -     Ferritin; Future  -     Ceruloplasmin; Future  -     FELICITY; Future  -     KIM Fibrosure Plus; Future  -     CBC & Differential; Future  -     TSH Rfx On Abnormal To Free T4; Future      Problem List Items Addressed This Visit          Genitourinary and Reproductive     Irregular menstrual cycle    Relevant Medications    norethindrone (MICRONOR) 0.35 MG tablet       Mental Health    Depression    Relevant Medications    buPROPion SR (WELLBUTRIN SR) 150 MG 12 hr tablet     Other Visit Diagnoses       RUQ abdominal pain    -  Primary    Relevant Orders    D-dimer, Quantitative (Completed)    proBNP (Completed)    Comprehensive Metabolic Panel (Completed)    Ferritin (Completed)    Ceruloplasmin (Completed)    FELICITY (Completed)    KIM Fibrosure Plus (Completed)    CBC & Differential (Completed)    TSH Rfx On Abnormal To Free T4 (Completed)    Elevated liver enzymes        Relevant Orders    D-dimer, Quantitative (Completed)    proBNP (Completed)    Comprehensive Metabolic Panel (Completed)    Ferritin (Completed)    Ceruloplasmin (Completed)    FELICITY (Completed)    KIM Fibrosure Plus (Completed)    CBC & Differential (Completed)    TSH Rfx On Abnormal To Free T4 (Completed)    Leg swelling        Relevant Orders    D-dimer, Quantitative (Completed)    proBNP (Completed)    Comprehensive Metabolic Panel (Completed)    Ferritin (Completed)    Ceruloplasmin (Completed)    FELICITY (Completed)    KIM Fibrosure Plus (Completed)    CBC & Differential (Completed)    TSH Rfx On Abnormal To Free T4 (Completed)            There are no Patient Instructions on file for this visit.    Follow Up:   No follow-ups on  file.        DO CYNTHIA BenjaminE [address]  [facility]  [address]  [facility] [address]  Fax [phone]  Phone [phone]    Disclaimer to patients: The 21st Century C[employer] Act makes medical notes like these available to patients in the interest of transparency. However, please be advised that this is still a medical document. It is intended as [id number]-mr-yfjp communication. Many sections may include medical language or jargon, abbreviations, and additional verbiage that are unfamiliar or confusing. In some ways it may come across as blunt, direct, or may be summarized in order to clearly and concisely communicate the most crucial information to medical professionals. It may also include mentions of conditions that are unlikely but considered as part of the differential diagnosis, including serious disorders. These are not always discussed at length at the time of appointment because their likelihood is so low, but may be included in a medical note to make it clear what has been considered and/or ruled out as part of a work-up. Medical documents are intended to carry relevant information, facts as evident, and the personal clinical opinion of the physician. If you have any questions regarding this medical document, please bring them to the attention of the physician at your next scheduled appointment.

## 2025-01-27 LAB — ANA SER QL: NEGATIVE

## 2025-01-28 LAB
A2 MACROGLOB SERPL-MCNC: 176 MG/DL (ref 110–276)
ALT SERPL W P-5'-P-CCNC: 24 IU/L (ref 0–40)
APO A-I SERPL-MCNC: 144 MG/DL (ref 116–209)
AST SERPL W P-5'-P-CCNC: 24 IU/L (ref 0–40)
BILIRUB SERPL-MCNC: <0.1 MG/DL (ref 0–1.2)
CHOLEST SERPL-MCNC: 188 MG/DL (ref 100–199)
FIBROSIS SCORING:: ABNORMAL
FIBROSIS STAGE SERPL QL: ABNORMAL
GGT SERPL-CCNC: 21 IU/L (ref 0–60)
GLUCOSE SERPL-MCNC: 100 MG/DL (ref 70–99)
HAPTOGLOB SERPL-MCNC: 219 MG/DL (ref 33–278)
LABORATORY COMMENT REPORT: ABNORMAL
LIVER FIBR SCORE SERPL CALC.FIBROSURE: 0.02 (ref 0–0.21)
LIVER STEATOSIS GRADE SERPL QL: ABNORMAL
LIVER STEATOSIS SCORE SERPL: 0.39 (ref 0–0.4)
NASH GRADE SERPL QL: ABNORMAL
NASH INTERPRETATION SERPL-IMP: ABNORMAL
NASH SCORE SERPL: 0 (ref 0–0.25)
NASH SCORING: ABNORMAL
STEATOSIS SCORING: ABNORMAL
TEST PERFORMANCE INFO SPEC: ABNORMAL
TEST PERFORMANCE INFO SPEC: ABNORMAL
TRIGL SERPL-MCNC: 133 MG/DL (ref 0–149)

## 2025-01-31 ENCOUNTER — TELEPHONE (OUTPATIENT)
Dept: FAMILY MEDICINE CLINIC | Facility: CLINIC | Age: 35
End: 2025-01-31

## 2025-01-31 NOTE — TELEPHONE ENCOUNTER
your Call Action Grid or Workflows for direction.    Caller: Sasha Stewart    Relationship: Self    Best call back number: [phone]    Caller requesting test results: PATIENT    What test was performed: LABS    When was the test performed: 01/24/25    Where was the test performed: OFFICE AT [facility]     Additional notes: PLEASE CALL PATIENT WITH RESULTS

## 2025-04-23 DIAGNOSIS — N92.6 IRREGULAR MENSTRUAL CYCLE: ICD-10-CM

## 2025-04-23 RX ORDER — ACETAMINOPHEN AND CODEINE PHOSPHATE 120; 12 MG/5ML; MG/5ML
1 SOLUTION ORAL DAILY
Qty: 28 TABLET | Refills: 0 | Status: SHIPPED | OUTPATIENT
Start: 2025-04-23

## 2025-04-23 NOTE — TELEPHONE ENCOUNTER
Rx Refill Note  Requested Prescriptions     Pending Prescriptions Disp Refills    norethindrone (MICRONOR) 0.35 MG tablet [Pharmacy Med Name: Norethindrone 0.35 MG Oral Tablet] 28 tablet 0     Sig: Take 1 tablet by mouth once daily      Last office visit with prescribing clinician: 1/24/2025   Last telemedicine visit with prescribing clinician: Visit date not found   Next office visit with prescribing clinician: Visit date not found                         Would you like a call back once the refill request has been completed: [] Yes [] No    If the office needs to give you a call back, can they leave a voicemail: [] Yes [] No    Elisha Land MA  04/23/25, 10:49 EDT

## 2025-08-08 ENCOUNTER — OFFICE VISIT (OUTPATIENT)
Dept: FAMILY MEDICINE CLINIC | Facility: CLINIC | Age: 35
End: 2025-08-08
Payer: COMMERCIAL

## 2025-08-08 ENCOUNTER — LAB (OUTPATIENT)
Dept: LAB | Facility: HOSPITAL | Age: 35
End: 2025-08-08
Payer: COMMERCIAL

## 2025-08-08 VITALS
DIASTOLIC BLOOD PRESSURE: 80 MMHG | HEART RATE: 102 BPM | BODY MASS INDEX: 46.48 KG/M2 | SYSTOLIC BLOOD PRESSURE: 126 MMHG | HEIGHT: 65 IN | OXYGEN SATURATION: 99 % | WEIGHT: 279 LBS

## 2025-08-08 DIAGNOSIS — D50.9 IRON DEFICIENCY ANEMIA, UNSPECIFIED IRON DEFICIENCY ANEMIA TYPE: ICD-10-CM

## 2025-08-08 DIAGNOSIS — F51.04 PSYCHOPHYSIOLOGIC DYSSOMNIA: ICD-10-CM

## 2025-08-08 DIAGNOSIS — E66.813 CLASS 3 SEVERE OBESITY DUE TO EXCESS CALORIES WITHOUT SERIOUS COMORBIDITY WITH BODY MASS INDEX (BMI) OF 45.0 TO 49.9 IN ADULT: Primary | ICD-10-CM

## 2025-08-08 LAB
ANION GAP SERPL CALCULATED.3IONS-SCNC: 10.4 MMOL/L (ref 5–15)
BUN SERPL-MCNC: 8 MG/DL (ref 6–20)
BUN/CREAT SERPL: 10 (ref 7–25)
CALCIUM SPEC-SCNC: 9.8 MG/DL (ref 8.6–10.5)
CHLORIDE SERPL-SCNC: 105 MMOL/L (ref 98–107)
CO2 SERPL-SCNC: 23.6 MMOL/L (ref 22–29)
CREAT SERPL-MCNC: 0.8 MG/DL (ref 0.57–1)
EGFRCR SERPLBLD CKD-EPI 2021: 98.7 ML/MIN/1.73
FERRITIN SERPL-MCNC: 19.1 NG/ML (ref 13–150)
GLUCOSE SERPL-MCNC: 94 MG/DL (ref 65–99)
IRON 24H UR-MRATE: 34 MCG/DL (ref 37–145)
IRON SATN MFR SERPL: 6 % (ref 20–50)
POTASSIUM SERPL-SCNC: 3.9 MMOL/L (ref 3.5–5.2)
SODIUM SERPL-SCNC: 139 MMOL/L (ref 136–145)
TIBC SERPL-MCNC: 535 MCG/DL (ref 298–536)
TRANSFERRIN SERPL-MCNC: 359 MG/DL (ref 200–360)

## 2025-08-08 PROCEDURE — 82746 ASSAY OF FOLIC ACID SERUM: CPT

## 2025-08-08 PROCEDURE — 80048 BASIC METABOLIC PNL TOTAL CA: CPT

## 2025-08-08 PROCEDURE — 84466 ASSAY OF TRANSFERRIN: CPT

## 2025-08-08 PROCEDURE — 83540 ASSAY OF IRON: CPT

## 2025-08-08 PROCEDURE — 85045 AUTOMATED RETICULOCYTE COUNT: CPT

## 2025-08-08 PROCEDURE — 85027 COMPLETE CBC AUTOMATED: CPT

## 2025-08-08 PROCEDURE — 82728 ASSAY OF FERRITIN: CPT

## 2025-08-08 PROCEDURE — 82607 VITAMIN B-12: CPT

## 2025-08-08 RX ORDER — TIRZEPATIDE 2.5 MG/.5ML
2.5 INJECTION, SOLUTION SUBCUTANEOUS WEEKLY
Qty: 2 ML | Refills: 0 | Status: SHIPPED | OUTPATIENT
Start: 2025-08-08

## 2025-08-09 LAB
DEPRECATED RDW RBC AUTO: 50.5 FL (ref 37–54)
ERYTHROCYTE [DISTWIDTH] IN BLOOD BY AUTOMATED COUNT: 16.7 % (ref 12.3–15.4)
FOLATE SERPL-MCNC: 7.44 NG/ML (ref 4.78–24.2)
HCT VFR BLD AUTO: 38.4 % (ref 34–46.6)
HGB BLD-MCNC: 11.7 G/DL (ref 12–15.9)
MCH RBC QN AUTO: 25.4 PG (ref 26.6–33)
MCHC RBC AUTO-ENTMCNC: 30.5 G/DL (ref 31.5–35.7)
MCV RBC AUTO: 83.3 FL (ref 79–97)
PLATELET # BLD AUTO: 439 10*3/MM3 (ref 140–450)
PMV BLD AUTO: 11.1 FL (ref 6–12)
RBC # BLD AUTO: 4.61 10*6/MM3 (ref 3.77–5.28)
RETICS # AUTO: 0.06 10*6/MM3 (ref 0.02–0.13)
RETICS/RBC NFR AUTO: 1.3 % (ref 0.7–1.9)
VIT B12 BLD-MCNC: 537 PG/ML (ref 211–946)
WBC NRBC COR # BLD AUTO: 8.1 10*3/MM3 (ref 3.4–10.8)

## 2025-08-12 ENCOUNTER — PRIOR AUTHORIZATION (OUTPATIENT)
Dept: FAMILY MEDICINE CLINIC | Facility: CLINIC | Age: 35
End: 2025-08-12
Payer: COMMERCIAL